# Patient Record
Sex: MALE | Race: BLACK OR AFRICAN AMERICAN | NOT HISPANIC OR LATINO | URBAN - METROPOLITAN AREA
[De-identification: names, ages, dates, MRNs, and addresses within clinical notes are randomized per-mention and may not be internally consistent; named-entity substitution may affect disease eponyms.]

---

## 2024-11-03 ENCOUNTER — INPATIENT (INPATIENT)
Facility: HOSPITAL | Age: 79
LOS: 4 days | Discharge: HOME | End: 2024-11-08
Attending: HOSPITALIST | Admitting: HOSPITALIST
Payer: MEDICARE

## 2024-11-03 VITALS
OXYGEN SATURATION: 96 % | DIASTOLIC BLOOD PRESSURE: 61 MMHG | HEART RATE: 88 BPM | SYSTOLIC BLOOD PRESSURE: 94 MMHG | RESPIRATION RATE: 18 BRPM | TEMPERATURE: 99 F

## 2024-11-03 DIAGNOSIS — Z96.651 PRESENCE OF RIGHT ARTIFICIAL KNEE JOINT: Chronic | ICD-10-CM

## 2024-11-03 DIAGNOSIS — Z87.898 PERSONAL HISTORY OF OTHER SPECIFIED CONDITIONS: ICD-10-CM

## 2024-11-03 DIAGNOSIS — K86.89 OTHER SPECIFIED DISEASES OF PANCREAS: ICD-10-CM

## 2024-11-03 DIAGNOSIS — E80.6 OTHER DISORDERS OF BILIRUBIN METABOLISM: ICD-10-CM

## 2024-11-03 DIAGNOSIS — Z98.890 OTHER SPECIFIED POSTPROCEDURAL STATES: Chronic | ICD-10-CM

## 2024-11-03 DIAGNOSIS — R53.83 OTHER FATIGUE: ICD-10-CM

## 2024-11-03 DIAGNOSIS — R26.9 UNSPECIFIED ABNORMALITIES OF GAIT AND MOBILITY: ICD-10-CM

## 2024-11-03 DIAGNOSIS — R74.01 ELEVATION OF LEVELS OF LIVER TRANSAMINASE LEVELS: ICD-10-CM

## 2024-11-03 DIAGNOSIS — Z29.9 ENCOUNTER FOR PROPHYLACTIC MEASURES, UNSPECIFIED: ICD-10-CM

## 2024-11-03 LAB
ADD ON TEST-SPECIMEN IN LAB: SIGNIFICANT CHANGE UP
ADD ON TEST-SPECIMEN IN LAB: SIGNIFICANT CHANGE UP
ALBUMIN SERPL ELPH-MCNC: 3.5 G/DL — SIGNIFICANT CHANGE UP (ref 3.3–5)
ALP SERPL-CCNC: 300 U/L — HIGH (ref 40–120)
ALT FLD-CCNC: 183 U/L — HIGH (ref 4–41)
ANION GAP SERPL CALC-SCNC: 15 MMOL/L — HIGH (ref 7–14)
ANISOCYTOSIS BLD QL: SLIGHT — SIGNIFICANT CHANGE UP
APPEARANCE UR: CLEAR — SIGNIFICANT CHANGE UP
AST SERPL-CCNC: 122 U/L — HIGH (ref 4–40)
BACTERIA # UR AUTO: NEGATIVE /HPF — SIGNIFICANT CHANGE UP
BASOPHILS # BLD AUTO: 0 K/UL — SIGNIFICANT CHANGE UP (ref 0–0.2)
BASOPHILS NFR BLD AUTO: 0 % — SIGNIFICANT CHANGE UP (ref 0–2)
BILIRUB DIRECT SERPL-MCNC: 6.5 MG/DL — HIGH (ref 0–0.3)
BILIRUB INDIRECT FLD-MCNC: 1.5 MG/DL — HIGH (ref 0–1)
BILIRUB SERPL-MCNC: 8 MG/DL — HIGH (ref 0.2–1.2)
BILIRUB SERPL-MCNC: SIGNIFICANT CHANGE UP MG/DL (ref 0.2–1.2)
BILIRUB UR-MCNC: ABNORMAL
BUN SERPL-MCNC: 26 MG/DL — HIGH (ref 7–23)
CALCIUM SERPL-MCNC: 8.9 MG/DL — SIGNIFICANT CHANGE UP (ref 8.4–10.5)
CAST: 0 /LPF — SIGNIFICANT CHANGE UP (ref 0–4)
CHLORIDE SERPL-SCNC: 100 MMOL/L — SIGNIFICANT CHANGE UP (ref 98–107)
CO2 SERPL-SCNC: 24 MMOL/L — SIGNIFICANT CHANGE UP (ref 22–31)
COLOR SPEC: SIGNIFICANT CHANGE UP
CREAT SERPL-MCNC: 1.05 MG/DL — SIGNIFICANT CHANGE UP (ref 0.5–1.3)
DIFF PNL FLD: NEGATIVE — SIGNIFICANT CHANGE UP
EGFR: 71 ML/MIN/1.73M2 — SIGNIFICANT CHANGE UP
EOSINOPHIL # BLD AUTO: 0 K/UL — SIGNIFICANT CHANGE UP (ref 0–0.5)
EOSINOPHIL NFR BLD AUTO: 0 % — SIGNIFICANT CHANGE UP (ref 0–6)
GIANT PLATELETS BLD QL SMEAR: PRESENT — SIGNIFICANT CHANGE UP
GLUCOSE SERPL-MCNC: 159 MG/DL — HIGH (ref 70–99)
GLUCOSE UR QL: NEGATIVE MG/DL — SIGNIFICANT CHANGE UP
HCT VFR BLD CALC: 38.9 % — LOW (ref 39–50)
HGB BLD-MCNC: 13.4 G/DL — SIGNIFICANT CHANGE UP (ref 13–17)
IANC: 9.56 K/UL — HIGH (ref 1.8–7.4)
KETONES UR-MCNC: NEGATIVE MG/DL — SIGNIFICANT CHANGE UP
LEUKOCYTE ESTERASE UR-ACNC: NEGATIVE — SIGNIFICANT CHANGE UP
LYMPHOCYTES # BLD AUTO: 1.09 K/UL — SIGNIFICANT CHANGE UP (ref 1–3.3)
LYMPHOCYTES # BLD AUTO: 9.1 % — LOW (ref 13–44)
MACROCYTES BLD QL: SLIGHT — SIGNIFICANT CHANGE UP
MAGNESIUM SERPL-MCNC: 2.3 MG/DL — SIGNIFICANT CHANGE UP (ref 1.6–2.6)
MANUAL SMEAR VERIFICATION: SIGNIFICANT CHANGE UP
MCHC RBC-ENTMCNC: 28.6 PG — SIGNIFICANT CHANGE UP (ref 27–34)
MCHC RBC-ENTMCNC: 34.4 G/DL — SIGNIFICANT CHANGE UP (ref 32–36)
MCV RBC AUTO: 83.1 FL — SIGNIFICANT CHANGE UP (ref 80–100)
MONOCYTES # BLD AUTO: 1.31 K/UL — HIGH (ref 0–0.9)
MONOCYTES NFR BLD AUTO: 10.9 % — SIGNIFICANT CHANGE UP (ref 2–14)
NEUTROPHILS # BLD AUTO: 9.26 K/UL — HIGH (ref 1.8–7.4)
NEUTROPHILS NFR BLD AUTO: 73.7 % — SIGNIFICANT CHANGE UP (ref 43–77)
NEUTS BAND # BLD: 3.6 % — SIGNIFICANT CHANGE UP (ref 0–6)
NITRITE UR-MCNC: NEGATIVE — SIGNIFICANT CHANGE UP
PH UR: 5.5 — SIGNIFICANT CHANGE UP (ref 5–8)
PLAT MORPH BLD: NORMAL — SIGNIFICANT CHANGE UP
PLATELET # BLD AUTO: 133 K/UL — LOW (ref 150–400)
PLATELET COUNT - ESTIMATE: ABNORMAL
POIKILOCYTOSIS BLD QL AUTO: SLIGHT — SIGNIFICANT CHANGE UP
POTASSIUM SERPL-MCNC: 3.5 MMOL/L — SIGNIFICANT CHANGE UP (ref 3.5–5.3)
POTASSIUM SERPL-SCNC: 3.5 MMOL/L — SIGNIFICANT CHANGE UP (ref 3.5–5.3)
PROT SERPL-MCNC: 7 G/DL — SIGNIFICANT CHANGE UP (ref 6–8.3)
PROT UR-MCNC: 30 MG/DL
RBC # BLD: 4.68 M/UL — SIGNIFICANT CHANGE UP (ref 4.2–5.8)
RBC # FLD: 13.7 % — SIGNIFICANT CHANGE UP (ref 10.3–14.5)
RBC BLD AUTO: ABNORMAL
RBC CASTS # UR COMP ASSIST: 3 /HPF — SIGNIFICANT CHANGE UP (ref 0–4)
REVIEW: SIGNIFICANT CHANGE UP
SMUDGE CELLS # BLD: PRESENT — SIGNIFICANT CHANGE UP
SODIUM SERPL-SCNC: 139 MMOL/L — SIGNIFICANT CHANGE UP (ref 135–145)
SP GR SPEC: 1.09 — HIGH (ref 1–1.03)
SQUAMOUS # UR AUTO: 4 /HPF — SIGNIFICANT CHANGE UP (ref 0–5)
UROBILINOGEN FLD QL: 2 MG/DL (ref 0.2–1)
VARIANT LYMPHS # BLD: 2.7 % — SIGNIFICANT CHANGE UP (ref 0–6)
WBC # BLD: 11.98 K/UL — HIGH (ref 3.8–10.5)
WBC # FLD AUTO: 11.98 K/UL — HIGH (ref 3.8–10.5)
WBC UR QL: 0 /HPF — SIGNIFICANT CHANGE UP (ref 0–5)

## 2024-11-03 PROCEDURE — 99223 1ST HOSP IP/OBS HIGH 75: CPT

## 2024-11-03 PROCEDURE — 70450 CT HEAD/BRAIN W/O DYE: CPT | Mod: 26,MC

## 2024-11-03 PROCEDURE — 71045 X-RAY EXAM CHEST 1 VIEW: CPT | Mod: 26

## 2024-11-03 PROCEDURE — 74177 CT ABD & PELVIS W/CONTRAST: CPT | Mod: 26,MC

## 2024-11-03 PROCEDURE — 99285 EMERGENCY DEPT VISIT HI MDM: CPT

## 2024-11-03 RX ORDER — ENOXAPARIN SODIUM 40MG/0.4ML
40 SYRINGE (ML) SUBCUTANEOUS ONCE
Refills: 0 | Status: DISCONTINUED | OUTPATIENT
Start: 2024-11-03 | End: 2024-11-03

## 2024-11-03 RX ORDER — INFLUENZ VIR VAC TV P-SURF2003 15MCG/.5ML
0.5 SYRINGE (ML) INTRAMUSCULAR ONCE
Refills: 0 | Status: DISCONTINUED | OUTPATIENT
Start: 2024-11-03 | End: 2024-11-08

## 2024-11-03 RX ORDER — ENOXAPARIN SODIUM 40MG/0.4ML
40 SYRINGE (ML) SUBCUTANEOUS EVERY 24 HOURS
Refills: 0 | Status: DISCONTINUED | OUTPATIENT
Start: 2024-11-03 | End: 2024-11-06

## 2024-11-03 RX ORDER — OXYCODONE HYDROCHLORIDE 30 MG/1
2.5 TABLET ORAL EVERY 6 HOURS
Refills: 0 | Status: DISCONTINUED | OUTPATIENT
Start: 2024-11-03 | End: 2024-11-08

## 2024-11-03 RX ORDER — OXYCODONE HYDROCHLORIDE 30 MG/1
5 TABLET ORAL EVERY 6 HOURS
Refills: 0 | Status: DISCONTINUED | OUTPATIENT
Start: 2024-11-03 | End: 2024-11-08

## 2024-11-03 RX ORDER — CHLORHEXIDINE GLUCONATE 40 MG/ML
1 SOLUTION TOPICAL DAILY
Refills: 0 | Status: DISCONTINUED | OUTPATIENT
Start: 2024-11-03 | End: 2024-11-08

## 2024-11-03 RX ORDER — SODIUM CHLORIDE 9 MG/ML
1000 INJECTION, SOLUTION INTRAMUSCULAR; INTRAVENOUS; SUBCUTANEOUS ONCE
Refills: 0 | Status: COMPLETED | OUTPATIENT
Start: 2024-11-03 | End: 2024-11-03

## 2024-11-03 RX ORDER — LIDOCAINE HYDROCHLORIDE 40 MG/ML
1 SOLUTION TOPICAL DAILY
Refills: 0 | Status: DISCONTINUED | OUTPATIENT
Start: 2024-11-03 | End: 2024-11-08

## 2024-11-03 RX ADMIN — Medication 50 MILLILITER(S): at 18:38

## 2024-11-03 RX ADMIN — SODIUM CHLORIDE 1000 MILLILITER(S): 9 INJECTION, SOLUTION INTRAMUSCULAR; INTRAVENOUS; SUBCUTANEOUS at 12:19

## 2024-11-03 NOTE — ED PROVIDER NOTE - ATTENDING CONTRIBUTION TO CARE
pt evaluated at 11:09 on 11/3/24   The patient is a 79y Male who denies any prior  past medical and surgery history  PTED with generalized weakness progressing to point where over the past 2 days  he can no longer walk as described No f,c,chest pain n,v,diarrhea HE/BRPR noted no UTI s/s No focal motor/sensory lesions noted     Vital Signs Last 24 Hrs  T(F): 98.6 HR: 89 BP: 102/71 RR: 21 SpO2: 100% (03 Nov 2024 11:00)   PE: as described; ? scleral icterus LUE resting tremor     DATA:  EKG: pending at time of evaluation  LAB                        13.4   11.98 )-----------( 133      ( 03 Nov 2024 11:02 )             38.9     11-03    139  |  100  |  26[H]  ----------------------------<  159[H]  3.5   |  24  |  1.05    Ca    8.9      03 Nov 2024 11:02  Mg     2.30     11-03    TPro  7.0  /  Alb  3.5  /  TBili  8.0[H]  /  DBili  x   /  AST  122[H]  /  ALT  183[H]  /  AlkPhos  300[H]  11-03     IMPRESSION/RISK:  Dx=  Weakness in older adult   Consideration include Constellation of s/s point to new malignancy (elevated Alk; could also be superimposed infection or progression of dementia (Parkinson's) or any combination of the above labs = prerenal azotemia   Plan  labs type and screen electrolytes creatiinne LFTs lipase CXR UA CTS H/CAP reassess dispo as per results and response pt evaluated at 11:09 on 11/3/24   The patient is a 79y Male who denies any prior  past medical and surgery history  PTED with generalized weakness progressing to point where over the past 2 days  he can no longer walk as described No f,c,chest pain n,v,diarrhea HE/BRPR noted no UTI s/s No focal motor/sensory lesions noted     Vital Signs Last 24 Hrs  T(F): 98.6 HR: 89 BP: 102/71 RR: 21 SpO2: 100% (03 Nov 2024 11:00)   PE: as described; ? scleral icterus LUE resting tremor     DATA:  EKG: pending at time of evaluation  LAB                        13.4   11.98 )-----------( 133      ( 03 Nov 2024 11:02 )             38.9     11-03    139  |  100  |  26[H]  ----------------------------<  159[H]  3.5   |  24  |  1.05    Ca    8.9      03 Nov 2024 11:02  Mg     2.30     11-03    TPro  7.0  /  Alb  3.5  /  TBili  8.0[H]  /  DBili  x   /  AST  122[H]  /  ALT  183[H]  /  AlkPhos  300[H]  11-03     IMPRESSION/RISK:  Dx=  Weakness in older adult   Consideration include Constellation of s/s point to new malignancy (elevated Alk; could also be superimposed infection or progression of dementia (Parkinson's) or any combination of the above labs = prerenal azotemia   Plan  labs type and screen electrolytes creatiinne LFTs lipase CXR UA CTS H/CAP reassess management as per results and response but definite admit given inability to walk

## 2024-11-03 NOTE — ED PROVIDER NOTE - CLINICAL SUMMARY MEDICAL DECISION MAKING FREE TEXT BOX
79-year-old male no past medical history presents with 2 days of generalized weakness, worsening lower extremities, associated with 1 day of lightheadedness.  Patient can typically ambulate normally but has had difficulty the past 2 days.  Has not seen a doctor nor denies fevers, chest pain, shortness of breath, vomiting, nausea, vomiting, urinary symptoms afebrile, not tachycardic, lungs clear, epigastric and right upper quadrant tenderness to palpation, slight scleral icterus. Slight left tremor, shuffling gait. Possible undiagnosed Parkinson's, intraabdominal or intracranial mass.  Will also eval for infectious, etiology, electrolyte derangement. Labs, CT, fluids, reassess.

## 2024-11-03 NOTE — H&P ADULT - NSHPLABSRESULTS_GEN_ALL_CORE
LABS:                        13.4   11.98 )-----------( 133      ( 03 Nov 2024 11:02 )             38.9     11-03    139  |  100  |  26[H]  ----------------------------<  159[H]  3.5   |  24  |  1.05    Ca    8.9      03 Nov 2024 11:02  Mg     2.30     11-03    TPro  7.0  /  Alb  3.5  /  TBili  8.0[H]  /  DBili  x   /  AST  122[H]  /  ALT  183[H]  /  AlkPhos  300[H]  11-03          Urinalysis Basic - ( 03 Nov 2024 11:02 )    Color: x / Appearance: x / SG: x / pH: x  Gluc: 159 mg/dL / Ketone: x  / Bili: x / Urobili: x   Blood: x / Protein: x / Nitrite: x   Leuk Esterase: x / RBC: x / WBC x   Sq Epi: x / Non Sq Epi: x / Bacteria: x LABS:                        13.4   11.98 )-----------( 133      ( 03 Nov 2024 11:02 )             38.9     11-03    139  |  100  |  26[H]  ----------------------------<  159[H]  3.5   |  24  |  1.05    Ca    8.9      03 Nov 2024 11:02  Mg     2.30     11-03    TPro  7.0  /  Alb  3.5  /  TBili  8.0[H]  /  DBili  x   /  AST  122[H]  /  ALT  183[H]  /  AlkPhos  300[H]  11-03      < from: CT Head No Cont (11.03.24 @ 12:03) >    FINDINGS:    VENTRICLES AND SULCI: Age appropriate involutional changes.  INTRA-AXIAL: No mass effect, acute hemorrhage, or midline shift.  There   are periventricular and subcortical white matter hypodensities,   consistent with microvascular type changes.  EXTRA-AXIAL: No mass or fluid collection. Basal cisterns are normal in   appearance.    VISUALIZED SINUSES:  Clear.  TYMPANOMASTOID CAVITIES:  Clear.  VISUALIZED ORBITS: Normal.  CALVARIUM: Intact.    MISCELLANEOUS: None.      IMPRESSION:  No acute intracranial hemorrhage, mass effect, or midline shift.    < end of copied text >    < from: CT Abdomen and Pelvis w/ IV Cont (11.03.24 @ 12:04) >    FINDINGS:  LOWER CHEST: Within normal limits.    LIVER: Multiple hepatic cysts measuring up to 3.3 cm in the left hepatic   lobe. Etiology pressure.  BILE DUCTS: Moderate intrahepatic biliary ductal dilatation with 2.0 cm   dilated CBD with abrupt cut off at a 1.8 x 2.3 x 1.6 cm periampullary   mass.  GALLBLADDER: Suspected small gallbladder without definitive gallstones.  SPLEEN: Within normal limits.  PANCREAS: 1.8 x 2.3 x 1.6 cm periampullary mass suspected series 301   image 58. No pancreatic ductal dilatation.  ADRENALS: Within normal limits.  KIDNEYS/URETERS: No renal stones or hydronephrosis. Left renal cyst.    BLADDER: Within normal limits.  REPRODUCTIVE ORGANS: Prostate within normal limits.    BOWEL: No bowel obstruction. Appendix is normal.  4.1 x 4.8 cm fluid collection or cyst adjacent to the left edge of the   liver and wall the stomach.  Large volume rectal stool without evidence of stercoral colitis.  PERITONEUM/RETROPERITONEUM: Within normal limits.  VESSELS: Within normal limits.  LYMPH NODES: No lymphadenopathy.  ABDOMINAL WALL: Tiny fat-containing right inguinal hernia. Nonspecific   small inguinal lymph nodes  BONES: Degenerative changes.    IMPRESSION:  1.8 x 2.3 x 1.6 cm suspected periampullary mass with moderate intra and   extrahepatic biliary ductal dilatation. No pancreatic ductal dilatation.    4.1 x 4.8 cm simple appearing fluid collection or cyst possibly arising   from either the left hepatic lobe or wall of the stomach. Causing mild   mass effect on the stomach.    Endoscopic ultrasound should be considered given the location            Urinalysis Basic - ( 03 Nov 2024 11:02 )    Color: x / Appearance: x / SG: x / pH: x  Gluc: 159 mg/dL / Ketone: x  / Bili: x / Urobili: x   Blood: x / Protein: x / Nitrite: x   Leuk Esterase: x / RBC: x / WBC x   Sq Epi: x / Non Sq Epi: x / Bacteria: x

## 2024-11-03 NOTE — ED PROVIDER NOTE - PROGRESS NOTE DETAILS
Patient found to have elevated LFTs and bilirubin,.  CT noticeable for periampullary mass with moderate intra and extrahepatic biliary duct dilatation.  Results discussed with patient and patient's family.  To be admitted.  Travis Bui PGY-3

## 2024-11-03 NOTE — ED ADULT NURSE NOTE - OBJECTIVE STATEMENT
Pt A&Ox4 to ED c/o dizziness, loss of appetite and generalized weakness with ambulation x 2 days. Denies chest pain, dizziness, blurry vision, sob. Facial symmetry noted. respirations even and unlabored. on cardiac monitor, NSR. 20g IV RAC. Labs drawn and sent. No acute distress noted at this time. Pending CT. Plan of care continued. Pt A&Ox4 to ED c/o dizziness, loss of appetite and generalized weakness with ambulation x 2 days. Dizziness since subsided. Denies chest pain, blurry vision, sob. Facial symmetry noted. respirations even and unlabored. on cardiac monitor, NSR. 20g IV RAC. Labs drawn and sent. No acute distress noted at this time. Pending CT. Plan of care continued. Pt A&Ox4 to ED c/o dizziness, loss of appetite and generalized weakness with ambulation x 2 days. Dizziness since subsided. Denies chest pain, blurry vision, sob. Facial symmetry noted. respirations even and unlabored. on cardiac monitor, NSR. 20g IV RAC. Icterus noted bilaterally. Labs drawn and sent. No acute distress noted at this time. Pending CT. Plan of care continued.

## 2024-11-03 NOTE — H&P ADULT - PROBLEM SELECTOR PLAN 2
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 Elevated alk phos, T. bili, and GGT concerning for cholestatic picture.     - F/u GI recs   - Consider MRCP Periampullary mass found on CTAP, with intrahepatic bile duct dilation. Elevated alk phos, T. bili, and GGT concerning for cholestatic picture.     - F/u GI recs   - Consider MRCP  - CEA,  Periampullary mass found on CTAP, with intrahepatic bile duct dilation. Elevated alk phos, T. bili, and GGT concerning for cholestatic picture.     - F/u GI recs   - MRCP   - CEA,

## 2024-11-03 NOTE — ED ADULT NURSE NOTE - NSFALLHARMRISKINTERV_ED_ALL_ED

## 2024-11-03 NOTE — H&P ADULT - PROBLEM SELECTOR PLAN 1
Patient with week-long history of migratory abdominal pain, likely related to mass found on imaging.    - Pain control with Tylenol PRN  - F/u GI recs  - Trend LFT's, bilirubin  - Serial abdominal exams Patient with week-long history of migratory abdominal pain, likely related to mass found on imaging.    - oxy 2.5 q6 PRN for mild and oxy 5q6 for moderate/severe pain   - F/u GI recs  - Trend LFT's, bilirubin  - Serial abdominal exams

## 2024-11-03 NOTE — H&P ADULT - PROBLEM SELECTOR PLAN 5
Patient with history of fatigue, unclear etiology however likely related to pancreatic mass; hemoglobin wnl     - Encourage PO intake   - F/u nutrition consult   - F/u TSH, B12, B9 levels
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
## 2024-11-03 NOTE — H&P ADULT - ASSESSMENT
Patient is an 79 year old male with no significant past medical history, presenting to the ED for history of abdominal pain and weakness. Patient's imaging concerning for pancreatic mass with hepatic cysts; orthopnea and weakness could also be related to undiagnosed CHF. Gastroenterology following for potential biopsy as well as biliary stent given patient's elevated bilirubin.

## 2024-11-03 NOTE — PATIENT PROFILE ADULT - FALL HARM RISK - PATIENT NEEDS ASSISTANCE
Standing/Walking/Toileting
Pt denies having traveled outside the country for the last 14 days. Pt denies having had the COVID19 infection and denies COVID19 positive contacts within the last 14 days. Pt denies receiving the COVID19 vaccine.

## 2024-11-03 NOTE — ED PROVIDER NOTE - PHYSICAL EXAMINATION
GEN: NAD. A&Ox3. Non-toxic appearing.  HEENT: normocephalic, atraumatic, EOMI, PERRL, slight scleral icterus, no conjuntival pallor, moist MM  CARDIAC: RRR, S1, S2, no murmur. Radial pulses present and symmetric b/l  PULM: CTA B/L no wheeze, rhonchi, rales.   ABD: soft NT, ND, no rebound no guarding   MSK: Moving all extremities, no edema  NEURO: shuffling gait, slight LUE tremor no focal neurological deficits, CN 2-12 grossly intact  SKIN: warm, dry, no rash.

## 2024-11-03 NOTE — H&P ADULT - PROBLEM SELECTOR PLAN 3
Total bilirbuin elevated to 8, cholestatic picture including elevations in GGT, Alk Phos, and LFT's    - F/u direct bili in the AM  - Continue to trend Total bilirubin elevated to 8, cholestatic picture including elevations in GGT, Alk Phos, and LFT's    - F/u direct bili in the AM  - Continue to trend

## 2024-11-03 NOTE — CHART NOTE - NSCHARTNOTEFT_GEN_A_CORE
GI consult received and chart reviewed. Patient likely requires EUS/ERCP for evaluation of pancreatic mass and drainage of biliary stricture. Made NPO @ [facility] for possible procedure Monday pending reviewed with advanced GI attending and schedule availability. Please obtain 4AM labs including PT/INR. Formal consult to follow in the AM.    Eduin Doherty MD  Gastroenterology/Hepatology Fellow

## 2024-11-03 NOTE — H&P ADULT - HISTORY OF PRESENT ILLNESS
Patient is an 79 year old male with no significant past medial history presenting to the ED with a week long history of vague abdominal pain that travels throughout his abdomen that he describes as "cutting across his abdomen", and weakness that has been bothering him for the past week. He has been struggling to get out of bed for the past week limited by his weakness. Patient has not had close follow up with a doctor in years, and has had no colonoscopies in the past. Does not take any medications. Patient is also complaining of constipation, and mentions decreased appetite but no significant weight loss. Also endorses two day history of voice hoarseness. Patient with no significant family history, mother lived to 101 and father lived to 98.     Patient's friend at bedside also mentioning that patient has been having gait disturbances; mentioning what appeared to be shuffling gait. Patient had difficulty getting up from bed so could not demonstrate. Also mentioned a three month history of left hand tremor, but is not present at time of interview.     In the ED, patient's BP was low at 94/61, and got 1 Liter of fluids, with BP readings going up to 119/70 after fluid resuscitation Otherwise patient's vitals were stable and was admitted to the floor in stable condition.

## 2024-11-03 NOTE — ED ADULT TRIAGE NOTE - CHIEF COMPLAINT QUOTE
Pt c/o dizziness starting 5 AM this morning with unsteady gait. No facial droop or slurred speech. Equal strength to BUE, BLE. Endorsing generalized weakness. At mental baseline as per family. Pt has no known medical Hx.  . MD Garcia called for stroke eval. No code stroke as per MD.

## 2024-11-03 NOTE — PATIENT PROFILE ADULT - FLU SEASON?
You can access the FollowMyHealth Patient Portal offered by [facility] by registering at the following website: [email or web address]. By joining [facility]’s FollowMyHealth portal, you will also be able to view your health information using other applications (apps) compatible with our system.
Yes...

## 2024-11-03 NOTE — H&P ADULT - ATTENDING COMMENTS
79 year old male with no significant past medical history p/w abdominal pain and weakness.     CT A/P  1.8 x 2.3 x 1.6 cm suspected periampullary mass with moderate intra and   extrahepatic biliary ductal dilatation. No pancreatic ductal dilatation. 4.1 x 4.8 cm simple appearing fluid collection or cyst possibly arising   from either the left hepatic lobe or wall of the stomach. Causing mild mass effect on the stomach.    #Abp pain:  --Likely i/s/o periampullary mass/ biliary ductal dilatation  --Obtain MRCP  --PRN Oxycodone for pain control  --GI Eval for possible ERCP    DVT: Lovenox  DIET: Regular  DISPO: Pending hospital course 79 year old male with no significant past medical history p/w abdominal pain and weakness.     CT A/P  1.8 x 2.3 x 1.6 cm suspected periampullary mass with moderate intra and   extrahepatic biliary ductal dilatation. No pancreatic ductal dilatation. 4.1 x 4.8 cm simple appearing fluid collection or cyst possibly arising   from either the left hepatic lobe or wall of the stomach. Causing mild mass effect on the stomach.    #Abd pain:  --Likely i/s/o periampullary mass/ biliary ductal dilatation  --Obtain MRCP  --PRN Oxycodone for pain control  --GI Eval for possible ERCP    DVT: Lovenox  DIET: Regular  DISPO: Pending hospital course

## 2024-11-03 NOTE — PATIENT PROFILE ADULT - FALL HARM RISK - RISK INTERVENTIONS

## 2024-11-03 NOTE — H&P ADULT - NSHPSOCIALHISTORY_GEN_ALL_CORE
Patient does not smoke, or use illicit substances. Patient consumes alcohol only on rare occasions.    Patient lives alone and takes care of all his ADL's. Former , now  retired.

## 2024-11-03 NOTE — H&P ADULT - PROBLEM SELECTOR PLAN 6
Patient with history of not being to sleep flat, and new voice hoarseness, with no lower extremity edema. Unlikely to be CHF, however cannot rule out at this point.    - F/u TTE results  - PT joseal Patient with history of not being to sleep flat, and new voice hoarseness, with no lower extremity edema. Unlikely to be CHF, however cannot rule out at this point.    - F/u TTE results  - PT eval  - CXR Patient with history of not being to sleep flat, and new voice hoarseness, with no lower extremity edema. Unlikely to be CHF, however cannot rule out at this point.    - F/u TTE results  - PT eval  - CXR  - D-dimer

## 2024-11-03 NOTE — H&P ADULT - NSHPREVIEWOFSYSTEMS_GEN_ALL_CORE
REVIEW OF SYSTEMS:    CONSTITUTIONAL: No weakness, fevers or chills  EYES/ENT: No visual changes;  No vertigo or throat pain   NECK: No pain or stiffness  RESPIRATORY: No cough, wheezing, hemoptysis; No shortness of breath  CARDIOVASCULAR: No chest pain or palpitations  GASTROINTESTINAL: No abdominal or epigastric pain. No nausea, vomiting, or hematemesis; No diarrhea or constipation. No melena or hematochezia.  GENITOURINARY: No dysuria, frequency or hematuria  NEUROLOGICAL: No numbness or weakness  SKIN: No itching, rashes REVIEW OF SYSTEMS:    CONSTITUTIONAL: + weakness, No fevers or chills  EYES/ENT: + voice hoarseness, No visual changes;  No vertigo or throat pain   NECK: No pain or stiffness  RESPIRATORY: No cough, wheezing, hemoptysis; No shortness of breath  CARDIOVASCULAR: + orthopnea, No chest pain or palpitations  GASTROINTESTINAL: + abdominal pain, decreased appetite and constipation. No nausea, vomiting, or hematemesis; No diarrhea.   GENITOURINARY: No dysuria, frequency or hematuria  NEUROLOGICAL: No numbness or weakness  SKIN: No itching, rashes

## 2024-11-03 NOTE — H&P ADULT - TIME BILLING
reviewing laboratory data, consultants' recommendations, documentation in [place], performing medically appropriate examinations/evaluations, discussion with patient/family/RN/ACP/Residents and interdisciplinary staff (such as , social workers, etc), counseling patient/family/care giver, ordering medically appropriate medication, tests, or procedures

## 2024-11-03 NOTE — H&P ADULT - NSHPPHYSICALEXAM_GEN_ALL_CORE
GENERAL: NAD, lying in bed comfortably  HEAD: Atraumatic, normocephalic  EYES: EOMI, PERRLA, conjunctiva and sclera clear  ENT: Moist mucous membranes  NECK: Supple, no JVD, no thyroidmegaly   HEART: S1, S2, Regular rate and rhythm, no murmurs, rubs, or gallops  LUNGS: Unlabored respirations, clear to auscultation bilaterally, no crackles, wheezing, or rhonchi  ABDOMEN: Soft, nontender, nondistended, +BS, no hepatosplenomegaly, no CVA tenderness   EXTREMITIES: 2+ peripheral pulses bilaterally. 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 No clubbing, cyanosis, or edema  NERVOUS SYSTEM:  A&Ox3, no focal deficits. 5/5 strength in bilateral hip flexors, extensors, knee flexors and extensors, and plantar/dorsiflexion of the feet. No visible tremor.   SKIN: No rashes or lesions

## 2024-11-03 NOTE — H&P ADULT - PROBLEM SELECTOR PLAN 4
Patient with new onset gait disturbance and history of hand tremors, potentially concerning for Parkinson's disease.    - PT eval   - F/u TSH, B12, B9,   - Revaluate gait when patient Patient with new onset gait disturbance and history of hand tremors, potentially concerning for Parkinson's disease.    - PT eval   - F/u TSH, B12, B9  - Revaluate gait when patient regains strength

## 2024-11-03 NOTE — H&P ADULT - PROBLEM SELECTOR PLAN 8
- Electrolytes: Will replete to maintain K>4, Phos>3, and Mag>2  - Nutrition: Regular diet   - DVT Prophylaxis: Lovenox   - Disposition: Pending PT eval - Fluids: LR @50 cc/hr for 10 hours   - Electrolytes: Will replete to maintain K>4, Phos>3, and Mag>2  - Nutrition: Regular diet   - DVT Prophylaxis: Lovenox   - Disposition: Pending PT eval - Fluids: LR @50 cc/hr for 20 hours   - Electrolytes: Will replete to maintain K>4, Phos>3, and Mag>2  - Nutrition: Regular diet   - DVT Prophylaxis: Lovenox   - Disposition: Pending PT eval

## 2024-11-03 NOTE — PATIENT PROFILE ADULT - VISION (WITH CORRECTIVE LENSES IF THE PATIENT USUALLY WEARS THEM):
Patient wears glasses/Normal vision: sees adequately in most situations; can see medication labels, newsprint

## 2024-11-04 LAB
ALBUMIN SERPL ELPH-MCNC: 3 G/DL — LOW (ref 3.3–5)
ALP SERPL-CCNC: 272 U/L — HIGH (ref 40–120)
ALT FLD-CCNC: 133 U/L — HIGH (ref 4–41)
ANION GAP SERPL CALC-SCNC: 14 MMOL/L — SIGNIFICANT CHANGE UP (ref 7–14)
AST SERPL-CCNC: 63 U/L — HIGH (ref 4–40)
BILIRUB DIRECT SERPL-MCNC: 2.6 MG/DL — HIGH (ref 0–0.3)
BILIRUB INDIRECT FLD-MCNC: 1 MG/DL — SIGNIFICANT CHANGE UP (ref 0–1)
BILIRUB SERPL-MCNC: 3.6 MG/DL — HIGH (ref 0.2–1.2)
BILIRUB SERPL-MCNC: 3.6 MG/DL — HIGH (ref 0.2–1.2)
BUN SERPL-MCNC: 24 MG/DL — HIGH (ref 7–23)
CALCIUM SERPL-MCNC: 8.6 MG/DL — SIGNIFICANT CHANGE UP (ref 8.4–10.5)
CEA SERPL-MCNC: 1.9 NG/ML — SIGNIFICANT CHANGE UP (ref 0–3.8)
CHLORIDE SERPL-SCNC: 102 MMOL/L — SIGNIFICANT CHANGE UP (ref 98–107)
CO2 SERPL-SCNC: 25 MMOL/L — SIGNIFICANT CHANGE UP (ref 22–31)
CREAT SERPL-MCNC: 0.81 MG/DL — SIGNIFICANT CHANGE UP (ref 0.5–1.3)
D DIMER BLD IA.RAPID-MCNC: 2971 NG/ML DDU — HIGH
EGFR: 89 ML/MIN/1.73M2 — SIGNIFICANT CHANGE UP
FOLATE SERPL-MCNC: 14.7 NG/ML — SIGNIFICANT CHANGE UP (ref 3.1–17.5)
GLUCOSE SERPL-MCNC: 115 MG/DL — HIGH (ref 70–99)
HCT VFR BLD CALC: 37.7 % — LOW (ref 39–50)
HGB BLD-MCNC: 12.8 G/DL — LOW (ref 13–17)
MAGNESIUM SERPL-MCNC: 2.6 MG/DL — SIGNIFICANT CHANGE UP (ref 1.6–2.6)
MCHC RBC-ENTMCNC: 29 PG — SIGNIFICANT CHANGE UP (ref 27–34)
MCHC RBC-ENTMCNC: 34 G/DL — SIGNIFICANT CHANGE UP (ref 32–36)
MCV RBC AUTO: 85.3 FL — SIGNIFICANT CHANGE UP (ref 80–100)
NRBC # BLD: 0 /100 WBCS — SIGNIFICANT CHANGE UP (ref 0–0)
NRBC # FLD: 0 K/UL — SIGNIFICANT CHANGE UP (ref 0–0)
PHOSPHATE SERPL-MCNC: 3 MG/DL — SIGNIFICANT CHANGE UP (ref 2.5–4.5)
PLATELET # BLD AUTO: 137 K/UL — LOW (ref 150–400)
POTASSIUM SERPL-MCNC: 3.5 MMOL/L — SIGNIFICANT CHANGE UP (ref 3.5–5.3)
POTASSIUM SERPL-SCNC: 3.5 MMOL/L — SIGNIFICANT CHANGE UP (ref 3.5–5.3)
PROT SERPL-MCNC: 6.8 G/DL — SIGNIFICANT CHANGE UP (ref 6–8.3)
RBC # BLD: 4.42 M/UL — SIGNIFICANT CHANGE UP (ref 4.2–5.8)
RBC # FLD: 14.2 % — SIGNIFICANT CHANGE UP (ref 10.3–14.5)
SODIUM SERPL-SCNC: 141 MMOL/L — SIGNIFICANT CHANGE UP (ref 135–145)
T4 AB SER-ACNC: 6.72 UG/DL — SIGNIFICANT CHANGE UP (ref 5.1–13)
TSH SERPL-MCNC: 4.95 UIU/ML — HIGH (ref 0.27–4.2)
VIT B12 SERPL-MCNC: 255 PG/ML — SIGNIFICANT CHANGE UP (ref 200–900)
WBC # BLD: 13.6 K/UL — HIGH (ref 3.8–10.5)
WBC # FLD AUTO: 13.6 K/UL — HIGH (ref 3.8–10.5)

## 2024-11-04 PROCEDURE — 99222 1ST HOSP IP/OBS MODERATE 55: CPT | Mod: FS,GC,25

## 2024-11-04 PROCEDURE — 99233 SBSQ HOSP IP/OBS HIGH 50: CPT | Mod: GC

## 2024-11-04 PROCEDURE — 71275 CT ANGIOGRAPHY CHEST: CPT | Mod: 26

## 2024-11-04 RX ORDER — SENNA 187 MG
2 TABLET ORAL AT BEDTIME
Refills: 0 | Status: DISCONTINUED | OUTPATIENT
Start: 2024-11-04 | End: 2024-11-08

## 2024-11-04 RX ORDER — MAGNESIUM HYDROXIDE 1200 MG/15ML
30 SUSPENSION ORAL DAILY
Refills: 0 | Status: DISCONTINUED | OUTPATIENT
Start: 2024-11-04 | End: 2024-11-08

## 2024-11-04 RX ORDER — POTASSIUM CHLORIDE 10 MEQ
20 TABLET, EXTENDED RELEASE ORAL
Refills: 0 | Status: COMPLETED | OUTPATIENT
Start: 2024-11-04 | End: 2024-11-04

## 2024-11-04 RX ORDER — POLYETHYLENE GLYCOL 3350 17 G/17G
17 POWDER, FOR SOLUTION ORAL
Refills: 0 | Status: DISCONTINUED | OUTPATIENT
Start: 2024-11-04 | End: 2024-11-08

## 2024-11-04 RX ORDER — MELATONIN 5 MG
6 TABLET ORAL AT BEDTIME
Refills: 0 | Status: DISCONTINUED | OUTPATIENT
Start: 2024-11-04 | End: 2024-11-08

## 2024-11-04 RX ADMIN — Medication 20 MILLIEQUIVALENT(S): at 18:29

## 2024-11-04 RX ADMIN — Medication 20 MILLIEQUIVALENT(S): at 21:52

## 2024-11-04 RX ADMIN — Medication 20 MILLIEQUIVALENT(S): at 16:24

## 2024-11-04 RX ADMIN — Medication 40 MILLIGRAM(S): at 21:58

## 2024-11-04 RX ADMIN — CHLORHEXIDINE GLUCONATE 1 APPLICATION(S): 40 SOLUTION TOPICAL at 16:25

## 2024-11-04 RX ADMIN — Medication 6 MILLIGRAM(S): at 21:57

## 2024-11-04 RX ADMIN — Medication 2 TABLET(S): at 21:55

## 2024-11-04 RX ADMIN — MAGNESIUM HYDROXIDE 30 MILLILITER(S): 1200 SUSPENSION ORAL at 01:35

## 2024-11-04 NOTE — PROGRESS NOTE ADULT - PROBLEM SELECTOR PLAN 3
Total bilirubin elevated to 8, cholestatic picture including elevations in GGT, Alk Phos, and LFT's    - F/u direct bili in the AM  - Continue to trend Patient with history of not being to sleep flat, and new voice hoarseness, with no lower extremity edema. Unlikely to be CHF, however cannot rule out at this point. Pt describes SOB when laying on abdomen and not flat on back over the last three days, less concern for acute cardio/pulm etiology. Rule out PE iso elevated D-dimer, and potential malignancy.     - F/u TTE results  - PT eval  - CXR: bibasilar atelactasis  - D-dimer: 2971 --> f/u CTPA (11/4) to rule out PE iso potential malignancy though vitals WNL and no chest pain.

## 2024-11-04 NOTE — DIETITIAN INITIAL EVALUATION ADULT - OTHER INFO
Unable to meet patient at bedside. Pt off of unit for procedure. Pt' son at bedside provided collateral. Observed pt's lunch tray with ~50% intake. Per RN flowsheet, Pt with variable PO intake 26-50% and 51-75% noted. Pt' son amenable to provide Orgain supplement 2x daily to optimize macronutrient intake. No GI distress reported i.e. nausea, vomiting, diarrhea. Per chart, Pt reported constipation. Unknown last BM. Recommend to continue to monitor and document in RN flowsheet. Consider bowel regimen as per MD discretion. No prior wt hx as per Henry GODOY. Labs noted for elevated BUN. Noted provision of IV hydration (lactated ringers) per medication list.

## 2024-11-04 NOTE — DIETITIAN INITIAL EVALUATION ADULT - REASON FOR ADMISSION
Per chart, Patient is an 79 year old male with no significant past medical history, presenting to the ED for history of abdominal pain and weakness. Labs and imaging were reflective of pancreatic mass with biliary obstruction, concerning for pancreatic malignancy, complicated by orthopnea, now admitted for further work up with GI following for biopsy via EUS/ERCP.

## 2024-11-04 NOTE — DIETITIAN INITIAL EVALUATION ADULT - PERSON TAUGHT/METHOD
Educated pt' son on the importance of consuming a diet adequate in protein and calories. Recommend to encouraged pt to consume meals starting with the protein rich food options. Discussed the benefits of oral nutrition supplementation; to encourage pt to take small sips in between meals to optimize protein intake. Pt' son receptive to education and made aware RD remains available upon request./son instructed

## 2024-11-04 NOTE — PROGRESS NOTE ADULT - PROBLEM SELECTOR PLAN 6
Patient with history of not being to sleep flat, and new voice hoarseness, with no lower extremity edema. Unlikely to be CHF, however cannot rule out at this point.    - F/u TTE results  - PT eval  - CXR  - D-dimer Patient with history of fatigue, unclear etiology however likely related to pancreatic mass; hemoglobin wnl     - Encourage PO intake   - F/u nutrition consult   - F/u TSH, B12, B9 levels: largely unremarkable

## 2024-11-04 NOTE — DIETITIAN INITIAL EVALUATION ADULT - ADD RECOMMEND
- Recommend to adjust diet to Lactose free.   - Nutrition department to provide Orgain Vegan Vanilla 2x daily (220kcal/16gm pro/11 fl oz).  - Recommend cont. to monitor BMs and document in RN flow sheet, if pt still experiencing constipation consider adding bowel regimen as per medical discretion.   - Encourage and assist Pt with meals, Monitor PO diet tolerance.   - Monitor weights, diet tolerance, skin integrity, BMs, pertinent labs.   - RDN services remain available as needed.   - Nutrition department to honor food preferences as feasible.

## 2024-11-04 NOTE — PROGRESS NOTE ADULT - PROBLEM SELECTOR PLAN 2
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 Elevated alk phos, T. bili, and GGT concerning for cholestatic picture.     - F/u GI recs   - MRCP   - CEA,  Patient with week-long history of migratory abdominal pain, likely related to mass found on imaging. Constipation can be another contributing factor as abdomen is distended on exam, CT abdomen revealed stool burden, and patient reports he is constipated. Low concern for peritonitis at this time, serial abdominal exams deferred.     - oxy 2.5 q6 PRN for mild and oxy 5q6 for moderate/severe pain   - F/u GI recs: see pancreatic mass  - Trend LFT's, bilirubin  - Bowel regimen for constipation

## 2024-11-04 NOTE — DIETITIAN INITIAL EVALUATION ADULT - ORAL INTAKE PTA/DIET HISTORY
Pt lives at home with family. They cook together at home. No difficulty obtaining groceries. No specific diet followed PTA. Pt' son reported decreased appetite for about 4 days related to abdominal pain. Per Pt' son lactose intolerance reported. NKFA reported. Will recommend to adjust diet to Lactose free. Denies chewing or swallowing difficulties PTA. No vitamins supplements taken PTA.

## 2024-11-04 NOTE — PROGRESS NOTE ADULT - PROBLEM SELECTOR PLAN 1
Patient with week-long history of migratory abdominal pain, likely related to mass found on imaging.    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 Elevated alk phos, T. bili, and GGT concerning for cholestatic picture.     - F/u GI recs: plan for ERCP, EUS on 11/5, CT chest for staging, will need oncology and surgical oncology evaluation  - f/u: CEA, CA 19-9

## 2024-11-04 NOTE — DIETITIAN INITIAL EVALUATION ADULT - PERTINENT MEDS FT
MEDICATIONS  (STANDING):  chlorhexidine 2% Cloths 1 Application(s) Topical daily  enoxaparin Injectable 40 milliGRAM(s) SubCutaneous every 24 hours  influenza  Vaccine (HIGH DOSE) 0.5 milliLiter(s) IntraMuscular once  lactated ringers. 1000 milliLiter(s) (50 mL/Hr) IV Continuous <Continuous>  lidocaine   4% Patch 1 Patch Transdermal daily  melatonin 6 milliGRAM(s) Oral at bedtime  potassium chloride    Tablet ER 20 milliEquivalent(s) Oral every 2 hours    MEDICATIONS  (PRN):  magnesium hydroxide Suspension 30 milliLiter(s) Oral daily PRN Constipation  oxyCODONE    IR 2.5 milliGRAM(s) Oral every 6 hours PRN Moderate Pain (4 - 6)  oxyCODONE    IR 5 milliGRAM(s) Oral every 6 hours PRN Severe Pain (7 - 10)

## 2024-11-04 NOTE — PROGRESS NOTE ADULT - ASSESSMENT
Patient is an 79 year old male with no significant past medical history, presenting to the ED for history of abdominal pain and weakness. Patient's imaging concerning for pancreatic mass with hepatic cysts; orthopnea and weakness could also be related to undiagnosed CHF. Gastroenterology following for potential biopsy as well as biliary stent given patient's elevated bilirubin.  Patient is an 79 year old male with no significant past medical history, presenting to the ED for history of abdominal pain and weakness. Labs and imaging were reflective of pancreatic mass with biliary obstruction, concerning for pancreatic malignancy, complicated by orthopnea, now admitted for further work up with GI following for biopsy via EUS/ERCP.

## 2024-11-04 NOTE — PROGRESS NOTE ADULT - PROBLEM SELECTOR PLAN 8
- Fluids: LR @50 cc/hr for 20 hours   - Electrolytes: Will replete to maintain K>4, Phos>3, and Mag>2  - Nutrition: Regular diet   - DVT Prophylaxis: Lovenox   - Disposition: Pending PT eval - Fluids: LR @50 cc/hr for 20 hours  - Electrolytes: Will replete to maintain K>4, Phos>3, and Mag>2  - Nutrition: Regular diet   - DVT Prophylaxis: Lovenox   - Disposition: Pending PT eval

## 2024-11-04 NOTE — PROGRESS NOTE ADULT - SUBJECTIVE AND OBJECTIVE BOX
PROGRESS NOTE:   Authored by Leanne Martinez MD  Internal Medicine Resident Physician, PGY-1      Patient is a 79y old  Male who presents with a chief complaint of Abdominal pain, weakness (2024 14:30)      SUBJECTIVE / OVERNIGHT EVENTS: ***, patient seen and examined at bedside    MEDICATIONS  (STANDING):  chlorhexidine 2% Cloths 1 Application(s) Topical daily  enoxaparin Injectable 40 milliGRAM(s) SubCutaneous every 24 hours  influenza  Vaccine (HIGH DOSE) 0.5 milliLiter(s) IntraMuscular once  lactated ringers. 1000 milliLiter(s) (50 mL/Hr) IV Continuous <Continuous>  lidocaine   4% Patch 1 Patch Transdermal daily  melatonin 6 milliGRAM(s) Oral at bedtime    MEDICATIONS  (PRN):  magnesium hydroxide Suspension 30 milliLiter(s) Oral daily PRN Constipation  oxyCODONE    IR 2.5 milliGRAM(s) Oral every 6 hours PRN Moderate Pain (4 - 6)  oxyCODONE    IR 5 milliGRAM(s) Oral every 6 hours PRN Severe Pain (7 - 10)      CAPILLARY BLOOD GLUCOSE      POCT Blood Glucose.: 146 mg/dL (2024 09:59)    I&O's Summary    2024 07:01  -  2024 07:00  --------------------------------------------------------  IN: 300 mL / OUT: 300 mL / NET: 0 mL        PHYSICAL EXAM:  Vital Signs Last 24 Hrs  T(C): 36.6 (2024 05:47), Max: 37 (2024 10:02)  T(F): 97.8 (2024 05:47), Max: 98.6 (2024 10:02)  HR: 70 (2024 05:47) (70 - 89)  BP: 121/70 (2024 05:47) (94/61 - 121/70)  BP(mean): --  RR: 18 (2024 05:47) (17 - 21)  SpO2: 100% (2024 05:47) (96% - 100%)    Parameters below as of 2024 05:47  Patient On (Oxygen Delivery Method): room air      CONSTITUTIONAL: Well-groomed, in no apparent distress  RESPIRATORY: Breathing comfortably; no dullness to percussion; lungs CTA without wheeze/rhonchi/rales  CARDIOVASCULAR: +S1S2, RRR, no M/G/R; pedal pulses full and symmetric; no lower extremity edema  GASTROINTESTINAL: No palpable masses or tenderness, +BS throughout, no rebound/guarding; no hepatosplenomegaly; no hernia palpated  SKIN: No rashes or ulcers noted  NEUROLOGIC: A+O x 3, CN II-XII intact; sensation intact in LEs b/l to light touch    LABS:                        13.4   11.98 )-----------( 133      ( 2024 11:02 )             38.9     11    139  |  100  |  26[H]  ----------------------------<  159[H]  3.5   |  24  |  1.05    Ca    8.9      2024 11:02  Mg     2.30         TPro  7.0  /  Alb  3.5  /  TBili  8.0[H]  /  DBili  6.5[H]  /  AST  122[H]  /  ALT  183[H]  /  AlkPhos  300[H]  11          Urinalysis Basic - ( 2024 15:00 )    Color: Dark Yellow / Appearance: Clear / S.086 / pH: x  Gluc: x / Ketone: Negative mg/dL  / Bili: Moderate / Urobili: 2.0 mg/dL   Blood: x / Protein: 30 mg/dL / Nitrite: Negative   Leuk Esterase: Negative / RBC: 3 /HPF / WBC 0 /HPF   Sq Epi: x / Non Sq Epi: 4 /HPF / Bacteria: Negative /HPF        Urinalysis with Rflx Culture (collected 2024 15:00)        RADIOLOGY & ADDITIONAL TESTS:  Results Reviewed:   Imaging Personally Reviewed:  Electrocardiogram Personally Reviewed:   PROGRESS NOTE:   Authored by Leanne Martinez MD  Internal Medicine Resident Physician, PGY-1      Patient is a 79y old  Male who presents with a chief complaint of Abdominal pain, weakness (2024 14:30)      SUBJECTIVE / OVERNIGHT EVENTS: Patient seen and examined at bedside. Feels better than yesterday. Does not endorse abdominal pain, chest pain, SOB, nausea. Endorses early satiety, has no appetite, and does have constipation (attributes to psychosocial factor of being in the hospital and lack of privacy).      MEDICATIONS  (STANDING):  chlorhexidine 2% Cloths 1 Application(s) Topical daily  enoxaparin Injectable 40 milliGRAM(s) SubCutaneous every 24 hours  influenza  Vaccine (HIGH DOSE) 0.5 milliLiter(s) IntraMuscular once  lactated ringers. 1000 milliLiter(s) (50 mL/Hr) IV Continuous <Continuous>  lidocaine   4% Patch 1 Patch Transdermal daily  melatonin 6 milliGRAM(s) Oral at bedtime    MEDICATIONS  (PRN):  magnesium hydroxide Suspension 30 milliLiter(s) Oral daily PRN Constipation  oxyCODONE    IR 2.5 milliGRAM(s) Oral every 6 hours PRN Moderate Pain (4 - 6)  oxyCODONE    IR 5 milliGRAM(s) Oral every 6 hours PRN Severe Pain (7 - 10)      CAPILLARY BLOOD GLUCOSE      POCT Blood Glucose.: 146 mg/dL (2024 09:59)    I&O's Summary    2024 07:01  -  2024 07:00  --------------------------------------------------------  IN: 300 mL / OUT: 300 mL / NET: 0 mL        PHYSICAL EXAM:  Vital Signs Last 24 Hrs  T(C): 36.6 (2024 05:47), Max: 37 (2024 10:02)  T(F): 97.8 (2024 05:47), Max: 98.6 (2024 10:02)  HR: 70 (2024 05:47) (70 - 89)  BP: 121/70 (2024 05:47) (94/61 - 121/70)  BP(mean): --  RR: 18 (2024 05:47) (17 - 21)  SpO2: 100% (2024 05:47) (96% - 100%)    Parameters below as of 2024 05:47  Patient On (Oxygen Delivery Method): room air      CONSTITUTIONAL: Well-groomed, in no apparent distress  RESPIRATORY: Breathing comfortably; no dullness to percussion; lungs CTA without wheeze/rhonchi/rales  CARDIOVASCULAR: +S1S2, RRR, no M/G/R; pedal pulses full and symmetric; no lower extremity edema  GASTROINTESTINAL: Distended but soft. No palpable masses or tenderness, +BS throughout, no rebound/guarding; no hepatosplenomegaly.   SKIN: No rashes or ulcers noted  NEUROLOGIC: A+O x 3.     LABS:                        13.4   11.98 )-----------( 133      ( 2024 11:02 )             38.9     11-03    139  |  100  |  26[H]  ----------------------------<  159[H]  3.5   |  24  |  1.05    Ca    8.9      2024 11:02  Mg     2.30     11-03    TPro  7.0  /  Alb  3.5  /  TBili  8.0[H]  /  DBili  6.5[H]  /  AST  122[H]  /  ALT  183[H]  /  AlkPhos  300[H]  11-03          Urinalysis Basic - ( 2024 15:00 )    Color: Dark Yellow / Appearance: Clear / S.086 / pH: x  Gluc: x / Ketone: Negative mg/dL  / Bili: Moderate / Urobili: 2.0 mg/dL   Blood: x / Protein: 30 mg/dL / Nitrite: Negative   Leuk Esterase: Negative / RBC: 3 /HPF / WBC 0 /HPF   Sq Epi: x / Non Sq Epi: 4 /HPF / Bacteria: Negative /HPF        Urinalysis with Rflx Culture (collected 2024 15:00)        RADIOLOGY & ADDITIONAL TESTS:  Results Reviewed:   Imaging Personally Reviewed:  Electrocardiogram Personally Reviewed:

## 2024-11-04 NOTE — CONSULT NOTE ADULT - ASSESSMENT
Mr. Rodriguez is an 79 year old male with no known PMH who presented with abdominal pain, weakness and unsteady gait. He is admitted with jaundice, abnormal liver chemistry and suspected periampullary mass with biliary obstruction.    #Pancreatic mass  #Biliary obstruction  #Jaundice  CT findings noted above, not yet discussed with patient as of this morning. Concern for pancreatic cancer with malignant biliary obstruction. Low concern for cholangitis.     Recommendations:  - Review of CT findings with patient per primary team. Please notify GI team following conversation  - Will likely benefit from EUS/ERCP   - NPO for possible procedures today pending above  - Check PT/INR, CBC & CMP today  - CT chest for staging    Note writer is post-call on Monday 11/4. Please contact GI fellow Kane Hall for updates.  Mr. Rodriguez is an 79 year old male with no known PMH who presented with abdominal pain, weakness and unsteady gait. He is admitted with jaundice, abnormal liver chemistry and suspected periampullary mass with biliary obstruction.    #Pancreatic mass  #Biliary obstruction  #Jaundice  CT findings noted above, not yet discussed with patient as of this morning. Concern for pancreatic cancer with malignant biliary obstruction. Low concern for cholangitis.     Recommendations:  - Review of CT findings with patient per primary team. Please notify GI team following conversation  - Will likely benefit from EUS/ERCP today  - NPO for possible procedures today pending above  - Check PT/INR, CBC & CMP today  - CT chest for staging    Note writer is post-call on Monday 11/4. Please contact GI fellow Kane Hall for updates.

## 2024-11-04 NOTE — DIETITIAN INITIAL EVALUATION ADULT - OTHER CALCULATIONS
Defer fluid needs to MD/Team.   Ideal Body Weight: 166lbs / 75.4kg +/-10%   Dosing wt used to calculate nutritional estimated needs.

## 2024-11-04 NOTE — PROGRESS NOTE ADULT - PROBLEM SELECTOR PLAN 4
Patient with new onset gait disturbance and history of hand tremors, potentially concerning for Parkinson's disease.    - PT eval   - F/u TSH, B12, B9  - Revaluate gait when patient regains strength Total bilirubin elevated to 8 on admission, cholestatic picture including elevations in GGT, Alk Phos, and LFT's. 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     - F/u direct bili in the AM  - Continue to trend

## 2024-11-04 NOTE — CONSULT NOTE ADULT - ATTENDING COMMENTS
Agree with above.    I saw and examined the patient on 11-04-24. I agree with the findings and plan of care as documented in the fellow/resident/medical student/physician assistant/nurse practitioner.    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  Plan on EGD/EUS/ERCP today.  Will need oncology and surgical oncology evaluation  f/u CT chest as part of workup.    Billing:  I have reviewed records from labs, imaging.    Other:  - Thank you for involving us in the care of this patient. If you have any questions regarding the plan of care please do not hesitate to call us back.    Contact:     Daytime        Available on [employer] Teams        [phone] ([facility] Short Range Pager)        [phone] ([facility] Long Range Pager)     On Weekends/Holidays (All day) and Weekdays after 5 PM to 8AM:        For non-urgent consults, please email [email or web address]        For emergent consults, please contact on call GI team.  See Amion schedule ([facility]), PrePlay paging system ([facility]), or call hospital  ([facility])

## 2024-11-04 NOTE — PROGRESS NOTE ADULT - PROBLEM SELECTOR PLAN 5
Patient with history of fatigue, unclear etiology however likely related to pancreatic mass; hemoglobin wnl     - Encourage PO intake   - F/u nutrition consult   - F/u TSH, B12, B9 levels Patient with new onset gait disturbance and history of hand tremors, potentially concerning for Parkinson's disease.    - PT eval   - F/u TSH, B12, B9: largely unremarkable   - Revaluate gait when patient regains strength

## 2024-11-05 LAB
ALBUMIN SERPL ELPH-MCNC: 3 G/DL — LOW (ref 3.3–5)
ALP SERPL-CCNC: 264 U/L — HIGH (ref 40–120)
ALT FLD-CCNC: 100 U/L — HIGH (ref 4–41)
ANION GAP SERPL CALC-SCNC: 12 MMOL/L — SIGNIFICANT CHANGE UP (ref 7–14)
APTT BLD: 30.6 SEC — SIGNIFICANT CHANGE UP (ref 24.5–35.6)
AST SERPL-CCNC: 40 U/L — SIGNIFICANT CHANGE UP (ref 4–40)
BASOPHILS # BLD AUTO: 0.03 K/UL — SIGNIFICANT CHANGE UP (ref 0–0.2)
BASOPHILS NFR BLD AUTO: 0.2 % — SIGNIFICANT CHANGE UP (ref 0–2)
BILIRUB SERPL-MCNC: 2.7 MG/DL — HIGH (ref 0.2–1.2)
BLD GP AB SCN SERPL QL: NEGATIVE — SIGNIFICANT CHANGE UP
BUN SERPL-MCNC: 18 MG/DL — SIGNIFICANT CHANGE UP (ref 7–23)
CALCIUM SERPL-MCNC: 8.3 MG/DL — LOW (ref 8.4–10.5)
CANCER AG19-9 SERPL-ACNC: 1506 U/ML — HIGH
CHLORIDE SERPL-SCNC: 103 MMOL/L — SIGNIFICANT CHANGE UP (ref 98–107)
CO2 SERPL-SCNC: 25 MMOL/L — SIGNIFICANT CHANGE UP (ref 22–31)
CREAT SERPL-MCNC: 0.76 MG/DL — SIGNIFICANT CHANGE UP (ref 0.5–1.3)
EGFR: 90 ML/MIN/1.73M2 — SIGNIFICANT CHANGE UP
EOSINOPHIL # BLD AUTO: 0.19 K/UL — SIGNIFICANT CHANGE UP (ref 0–0.5)
EOSINOPHIL NFR BLD AUTO: 1.6 % — SIGNIFICANT CHANGE UP (ref 0–6)
GLUCOSE SERPL-MCNC: 111 MG/DL — HIGH (ref 70–99)
HCT VFR BLD CALC: 38 % — LOW (ref 39–50)
HGB BLD-MCNC: 13 G/DL — SIGNIFICANT CHANGE UP (ref 13–17)
IANC: 10.08 K/UL — HIGH (ref 1.8–7.4)
IMM GRANULOCYTES NFR BLD AUTO: 1.5 % — HIGH (ref 0–0.9)
INR BLD: 1.06 RATIO — SIGNIFICANT CHANGE UP (ref 0.85–1.16)
LYMPHOCYTES # BLD AUTO: 1.22 K/UL — SIGNIFICANT CHANGE UP (ref 1–3.3)
LYMPHOCYTES # BLD AUTO: 10 % — LOW (ref 13–44)
MAGNESIUM SERPL-MCNC: 2.5 MG/DL — SIGNIFICANT CHANGE UP (ref 1.6–2.6)
MCHC RBC-ENTMCNC: 28.6 PG — SIGNIFICANT CHANGE UP (ref 27–34)
MCHC RBC-ENTMCNC: 34.2 G/DL — SIGNIFICANT CHANGE UP (ref 32–36)
MCV RBC AUTO: 83.5 FL — SIGNIFICANT CHANGE UP (ref 80–100)
MONOCYTES # BLD AUTO: 0.46 K/UL — SIGNIFICANT CHANGE UP (ref 0–0.9)
MONOCYTES NFR BLD AUTO: 3.8 % — SIGNIFICANT CHANGE UP (ref 2–14)
NEUTROPHILS # BLD AUTO: 10.08 K/UL — HIGH (ref 1.8–7.4)
NEUTROPHILS NFR BLD AUTO: 82.9 % — HIGH (ref 43–77)
NRBC # BLD: 0 /100 WBCS — SIGNIFICANT CHANGE UP (ref 0–0)
NRBC # FLD: 0 K/UL — SIGNIFICANT CHANGE UP (ref 0–0)
PHOSPHATE SERPL-MCNC: 3.1 MG/DL — SIGNIFICANT CHANGE UP (ref 2.5–4.5)
PLATELET # BLD AUTO: 165 K/UL — SIGNIFICANT CHANGE UP (ref 150–400)
POTASSIUM SERPL-MCNC: 3.8 MMOL/L — SIGNIFICANT CHANGE UP (ref 3.5–5.3)
POTASSIUM SERPL-SCNC: 3.8 MMOL/L — SIGNIFICANT CHANGE UP (ref 3.5–5.3)
PROT SERPL-MCNC: 6.5 G/DL — SIGNIFICANT CHANGE UP (ref 6–8.3)
PROTHROM AB SERPL-ACNC: 12.6 SEC — SIGNIFICANT CHANGE UP (ref 9.9–13.4)
RBC # BLD: 4.55 M/UL — SIGNIFICANT CHANGE UP (ref 4.2–5.8)
RBC # FLD: 14.3 % — SIGNIFICANT CHANGE UP (ref 10.3–14.5)
RH IG SCN BLD-IMP: POSITIVE — SIGNIFICANT CHANGE UP
SODIUM SERPL-SCNC: 140 MMOL/L — SIGNIFICANT CHANGE UP (ref 135–145)
WBC # BLD: 12.16 K/UL — HIGH (ref 3.8–10.5)
WBC # FLD AUTO: 12.16 K/UL — HIGH (ref 3.8–10.5)

## 2024-11-05 PROCEDURE — 43264 ERCP REMOVE DUCT CALCULI: CPT

## 2024-11-05 PROCEDURE — 76376 3D RENDER W/INTRP POSTPROCES: CPT | Mod: 26

## 2024-11-05 PROCEDURE — 99233 SBSQ HOSP IP/OBS HIGH 50: CPT | Mod: GC

## 2024-11-05 PROCEDURE — 93306 TTE W/DOPPLER COMPLETE: CPT | Mod: 26

## 2024-11-05 PROCEDURE — 43262 ENDO CHOLANGIOPANCREATOGRAPH: CPT

## 2024-11-05 PROCEDURE — 93356 MYOCRD STRAIN IMG SPCKL TRCK: CPT

## 2024-11-05 PROCEDURE — 43259 EGD US EXAM DUODENUM/JEJUNUM: CPT

## 2024-11-05 PROCEDURE — 74183 MRI ABD W/O CNTR FLWD CNTR: CPT | Mod: 26

## 2024-11-05 DEVICE — CATH BLLN EXTRACT DIST GUIDE WIRE 15MM 3LUM: Type: IMPLANTABLE DEVICE | Status: FUNCTIONAL

## 2024-11-05 DEVICE — IMPLANTABLE DEVICE: Type: IMPLANTABLE DEVICE | Status: FUNCTIONAL

## 2024-11-05 DEVICE — GWIRE JAGTOME REVOLUTION RX 260CM/0.025IN: Type: IMPLANTABLE DEVICE | Status: FUNCTIONAL

## 2024-11-05 RX ADMIN — Medication 40 MILLIGRAM(S): at 21:50

## 2024-11-05 RX ADMIN — Medication 6 MILLIGRAM(S): at 21:42

## 2024-11-05 RX ADMIN — Medication 2 TABLET(S): at 21:49

## 2024-11-05 RX ADMIN — POLYETHYLENE GLYCOL 3350 17 GRAM(S): 17 POWDER, FOR SOLUTION ORAL at 05:05

## 2024-11-05 NOTE — PROGRESS NOTE ADULT - PROBLEM SELECTOR PLAN 4
Total bilirubin elevated to 8 on admission, cholestatic picture including elevations in GGT, Alk Phos, and LFT's. 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     - F/u direct bili in the AM  - Continue to trend Total bilirubin elevated to 8 on admission, cholestatic picture including elevations in GGT, Alk Phos, and LFT's. 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     - Continue to trend: downtrending   - see pancreatic mass Total bilirubin elevated to 8 on admission, cholestatic picture including elevations in GGT, Alk Phos, and LFT's. 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     - Continue to trend: downtrending   - see pancreatic mass, likely iso of choledocho

## 2024-11-05 NOTE — PROGRESS NOTE ADULT - PROBLEM SELECTOR PLAN 3
Patient with history of not being to sleep flat, and new voice hoarseness, with no lower extremity edema. Unlikely to be CHF, however cannot rule out at this point. Pt describes SOB when laying on abdomen and not flat on back over the last three days, less concern for acute cardio/pulm etiology. Rule out PE iso elevated D-dimer, and potential malignancy.     - F/u TTE results  - PT eval  - CXR: bibasilar atelactasis  - D-dimer: 2971 --> f/u CTPA (11/4) to rule out PE iso potential malignancy though vitals WNL and no chest pain. Patient with history of not being to sleep flat, and new voice hoarseness, with no lower extremity edema. Unlikely to be CHF, however cannot rule out at this point. Pt describes SOB when laying on abdomen and not flat on back over the last three days, less concern for acute cardio/pulm etiology. Rule out PE iso elevated D-dimer, and potential malignancy.     s/p CTPA (11/4): no PE or masses visualized, elevated D-dimer likely iso malignancy     - F/u TTE results  - PT eval  - CXR: bibasilar atelactasis Patient with history of not being to sleep flat, and new voice hoarseness, with no lower extremity edema. Unlikely to be CHF, however cannot rule out at this point. Pt describes SOB when laying on abdomen and not flat on back over the last three days, less concern for acute cardio/pulm etiology. Rule out PE iso elevated D-dimer, and potential malignancy. Can be due to abdominal distension and positioning.     s/p CTPA (11/4): no PE or masses visualized, elevated D-dimer likely iso malignancy     - F/u TTE results: EF 55% and grade 1 diastolic dysfunction.   - PT eval: outpatient PT   - CXR: bibasilar atelactasis

## 2024-11-05 NOTE — PHYSICAL THERAPY INITIAL EVALUATION ADULT - REHAB POTENTIAL, PT EVAL
INR 1.7    Description          3/1 INR 1.7- warfarin 1mg MTH 1.5mg rest of the days, recheck in 2 weeks          
No change  Recheck one month  
Patient's  is aware and understands    Description          3/15 INR 1.7 warfarin 1mg Monday and Thursday, 1.5mg the rest of the days, recheck in one month          
good, to achieve stated therapy goals

## 2024-11-05 NOTE — DISCHARGE NOTE PROVIDER - NSDCFUADDAPPT_GEN_ALL_CORE_FT
APPTS ARE READY TO BE MADE: [x] YES    Best Family or Patient Contact (if needed):    Additional Information about above appointments (if needed):    1: PCP within 2 weeks of discharge  2: Hepatology within 2 weeks of discharge   3: Surgery with Dr. Ramirez within 2 weeks of discharge    Other comments or requests:    APPTS ARE READY TO BE MADE: [x] YES    Best Family or Patient Contact (if needed):    Additional Information about above appointments (if needed):    1: PCP within 2 weeks of discharge  2: Hepatology within 2 weeks of discharge   3: Surgery with Dr. Ramirez within 2 weeks of discharge    Other comments or requests:     Provided patient with provider referral information, however patient prefers to schedule the appointments with the help of their home aide.   APPTS ARE READY TO BE MADE: [x] YES    Best Family or Patient Contact (if needed):    Additional Information about above appointments (if needed):    1: PCP within 2 weeks of discharge  2: Hepatology within 2 weeks of discharge   3: Surgery with Dr. Ramirez within 2 weeks of discharge    Other comments or requests:     Provided patient with provider referral information, however patient prefers to schedule the appointments with the help of their home aide.    A [facility] office was contacted to secure an appointment, however the office will follow up with the patient/caregiver directly.

## 2024-11-05 NOTE — PHYSICAL THERAPY INITIAL EVALUATION ADULT - IMPAIRED TRANSFERS: SIT/STAND, REHAB EVAL
LVM, will discuss with patient at upcoming appt   decreased strength No Repair - Repaired With Adjacent Surgical Defect Text (Leave Blank If You Do Not Want): After obtaining clear surgical margins the defect was repaired concurrently with another surgical defect which was in close approximation.

## 2024-11-05 NOTE — PHYSICAL THERAPY INITIAL EVALUATION ADULT - PHYSICAL ASSIST/NONPHYSICAL ASSIST: GAIT, REHAB EVAL
Left a voice message to call back we can add echo with strain and then new patient apt with INOCENCIA Rios on 12/20/22 @8 am.   verbal cues/nonverbal cues (demo/gestures)/1 person assist Statement Selected

## 2024-11-05 NOTE — PROGRESS NOTE ADULT - ASSESSMENT
Patient is an 79 year old male with no significant past medical history, presenting to the ED for history of abdominal pain and weakness. Labs and imaging were reflective of pancreatic mass with biliary obstruction, concerning for pancreatic malignancy, complicated by orthopnea, now admitted for further work up with GI following for biopsy via EUS/ERCP.  Patient is an 79 year old male with no significant past medical history, presenting to the ED for history of abdominal pain and weakness. Labs and CT A/P were initially reflective of pancreatic mass with biliary obstruction, concerning for pancreatic malignancy, complicated by orthopnea, with MRCP and daily downtrending bilirubin now with less concern for pancreatic mass and more concern for choledocholithiasis  now admitted for further work up with GI following for potential EUS/ERCP.

## 2024-11-05 NOTE — PROGRESS NOTE ADULT - PROBLEM SELECTOR PLAN 8
- Fluids: LR @50 cc/hr for 20 hours  - Electrolytes: Will replete to maintain K>4, Phos>3, and Mag>2  - Nutrition: Regular diet   - DVT Prophylaxis: Lovenox   - Disposition: Pending PT eval - Fluids: s/p LR @50 cc/hr for 20 hours  - Electrolytes: Will replete to maintain K>4, Phos>3, and Mag>2  - Nutrition: Regular diet   - DVT Prophylaxis: Lovenox   - Disposition: Pending PT eval

## 2024-11-05 NOTE — PROGRESS NOTE ADULT - PROBLEM SELECTOR PLAN 6
Patient with history of fatigue, unclear etiology however likely related to pancreatic mass; hemoglobin wnl     - Encourage PO intake   - F/u nutrition consult   - F/u TSH, B12, B9 levels: largely unremarkable Patient with history of fatigue, unclear etiology however likely related to pancreatic mass; hemoglobin wnl     - Encourage PO intake   - F/u nutrition consult: lactose free diet, increase protein intake, orgain vegan vanilla 2x daily    - F/u TSH, B12, B9 levels: largely unremarkable

## 2024-11-05 NOTE — DISCHARGE NOTE PROVIDER - NSFOLLOWUPCLINICS_GEN_ALL_ED_FT
[facility]  Internal Medicine  [address] [place] [address]  Phone: [phone]  Fax: [phone]    [facility]  Gastroenterology  [address] [place] [address]  Phone: [phone]  Fax:

## 2024-11-05 NOTE — PROGRESS NOTE ADULT - PROBLEM SELECTOR PLAN 1
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 Elevated alk phos, T. bili, and GGT concerning for cholestatic picture.     - F/u GI recs: plan for ERCP, EUS on 11/5, CT chest for staging, will need oncology and surgical oncology evaluation  - f/u: CEA, CA 19-9 Periampullary mass found on CTAP, with intrahepatic bile duct dilation. Elevated alk phos, T. bili, and GGT concerning for cholestatic picture.     - F/u GI recs: plan for ERCP, EUS on 11/5, CT chest for staging (no masses visualized on CTPA), will need oncology and surgical oncology evaluation  - f/u: CEA WNL, CA 19-9: 1506 Periampullary mass found on CTAP, with intrahepatic bile duct dilation. Elevated alk phos, T. bili, and GGT concerning for cholestatic picture. MRCP with concern for obstructive two choledocholithiasis with intra and extrahepatic biliary ductal dilatation, and in the context of daily downtrending bili, may make pancreatic mass seen on initial CTAP less likely to represent malignant mass but rather two obstructing stones together.     - F/u GI recs: plan for ERCP, EUS on 11/5, CT chest for staging (no masses visualized on CTPA), will need oncology and surgical oncology evaluation  - f/u: CEA WNL, CA 19-9: 1506

## 2024-11-05 NOTE — PROGRESS NOTE ADULT - SUBJECTIVE AND OBJECTIVE BOX
PROGRESS NOTE:   Authored by Leanne Martinez MD  Internal Medicine Resident Physician, PGY-1      Patient is a 79y old  Male who presents with a chief complaint of Abdominal pain, weakness (2024 14:30)      SUBJECTIVE / OVERNIGHT EVENTS: Patient seen and examined at bedside. Feels better than yesterday. Does not endorse abdominal pain, chest pain, SOB, nausea. Endorses early satiety, has no appetite, and does have constipation (attributes to psychosocial factor of being in the hospital and lack of privacy).      MEDICATIONS  (STANDING):  chlorhexidine 2% Cloths 1 Application(s) Topical daily  enoxaparin Injectable 40 milliGRAM(s) SubCutaneous every 24 hours  influenza  Vaccine (HIGH DOSE) 0.5 milliLiter(s) IntraMuscular once  lactated ringers. 1000 milliLiter(s) (50 mL/Hr) IV Continuous <Continuous>  lidocaine   4% Patch 1 Patch Transdermal daily  melatonin 6 milliGRAM(s) Oral at bedtime    MEDICATIONS  (PRN):  magnesium hydroxide Suspension 30 milliLiter(s) Oral daily PRN Constipation  oxyCODONE    IR 2.5 milliGRAM(s) Oral every 6 hours PRN Moderate Pain (4 - 6)  oxyCODONE    IR 5 milliGRAM(s) Oral every 6 hours PRN Severe Pain (7 - 10)      CAPILLARY BLOOD GLUCOSE      POCT Blood Glucose.: 146 mg/dL (2024 09:59)    I&O's Summary    2024 07:01  -  2024 07:00  --------------------------------------------------------  IN: 300 mL / OUT: 300 mL / NET: 0 mL        PHYSICAL EXAM:  Vital Signs Last 24 Hrs  T(C): 36.6 (2024 05:47), Max: 37 (2024 10:02)  T(F): 97.8 (2024 05:47), Max: 98.6 (2024 10:02)  HR: 70 (2024 05:47) (70 - 89)  BP: 121/70 (2024 05:47) (94/61 - 121/70)  BP(mean): --  RR: 18 (2024 05:47) (17 - 21)  SpO2: 100% (2024 05:47) (96% - 100%)    Parameters below as of 2024 05:47  Patient On (Oxygen Delivery Method): room air      CONSTITUTIONAL: Well-groomed, in no apparent distress  RESPIRATORY: Breathing comfortably; no dullness to percussion; lungs CTA without wheeze/rhonchi/rales  CARDIOVASCULAR: +S1S2, RRR, no M/G/R; pedal pulses full and symmetric; no lower extremity edema  GASTROINTESTINAL: Distended but soft. No palpable masses or tenderness, +BS throughout, no rebound/guarding; no hepatosplenomegaly.   SKIN: No rashes or ulcers noted  NEUROLOGIC: A+O x 3.     LABS:                        13.4   11.98 )-----------( 133      ( 2024 11:02 )             38.9     11-03    139  |  100  |  26[H]  ----------------------------<  159[H]  3.5   |  24  |  1.05    Ca    8.9      2024 11:02  Mg     2.30     11-03    TPro  7.0  /  Alb  3.5  /  TBili  8.0[H]  /  DBili  6.5[H]  /  AST  122[H]  /  ALT  183[H]  /  AlkPhos  300[H]  11-03          Urinalysis Basic - ( 2024 15:00 )    Color: Dark Yellow / Appearance: Clear / S.086 / pH: x  Gluc: x / Ketone: Negative mg/dL  / Bili: Moderate / Urobili: 2.0 mg/dL   Blood: x / Protein: 30 mg/dL / Nitrite: Negative   Leuk Esterase: Negative / RBC: 3 /HPF / WBC 0 /HPF   Sq Epi: x / Non Sq Epi: 4 /HPF / Bacteria: Negative /HPF        Urinalysis with Rflx Culture (collected 2024 15:00)        RADIOLOGY & ADDITIONAL TESTS:  Results Reviewed:   Imaging Personally Reviewed:  Electrocardiogram Personally Reviewed:   CHIEF COMPLAINT: abdominal pain and weakness    Overnight Events: None  Interval Events: CTPA negative for PE, atelectasis, no visualized lung mass    Subjective: Patient at procedure this morning. Unable to elicit.     OBJECTIVE:  ICU Vital Signs Last 24 Hrs  T(C): 36.7 (05 Nov 2024 06:10), Max: 36.7 (04 Nov 2024 22:07)  T(F): 98 (05 Nov 2024 06:10), Max: 98 (04 Nov 2024 22:07)  HR: 73 (05 Nov 2024 06:10) (73 - 78)  BP: 117/79 (05 Nov 2024 06:10) (114/74 - 117/79)  BP(mean): --  ABP: --  ABP(mean): --  RR: 18 (05 Nov 2024 06:10) (17 - 18)  SpO2: 98% (05 Nov 2024 06:10) (98% - 99%)    O2 Parameters below as of 05 Nov 2024 06:10  Patient On (Oxygen Delivery Method): room air      POCT Blood Glucose.: 146 mg/dL (03 Nov 2024 09:59)      PHYSICAL EXAM  General:   Head:    Eyes:   Neck:   Cardiac:   Lungs:   Abdomen:   Extremities:   Neuro  Psych:   Skin:  Etc:      HOSPITAL MEDICATIONS:  MEDICATIONS  (STANDING):  chlorhexidine 2% Cloths 1 Application(s) Topical daily  enoxaparin Injectable 40 milliGRAM(s) SubCutaneous every 24 hours  influenza  Vaccine (HIGH DOSE) 0.5 milliLiter(s) IntraMuscular once  lactated ringers. 1000 milliLiter(s) (50 mL/Hr) IV Continuous <Continuous>  lidocaine   4% Patch 1 Patch Transdermal daily  melatonin 6 milliGRAM(s) Oral at bedtime  polyethylene glycol 3350 17 Gram(s) Oral two times a day  senna 2 Tablet(s) Oral at bedtime    MEDICATIONS  (PRN):  magnesium hydroxide Suspension 30 milliLiter(s) Oral daily PRN Constipation  oxyCODONE    IR 5 milliGRAM(s) Oral every 6 hours PRN Severe Pain (7 - 10)  oxyCODONE    IR 2.5 milliGRAM(s) Oral every 6 hours PRN Moderate Pain (4 - 6)      LABS:                        13.0   12.16 )-----------( 165      ( 05 Nov 2024 06:30 )             38.0     Hgb Trend: 13.0<--, 12.8<--, 13.4<--  11-05    140  |  103  |  18  ----------------------------<  111[H]  3.8   |  25  |  0.76    Ca    8.3[L]      05 Nov 2024 06:30  Phos  3.1     11-05  Mg     2.50     11-05    TPro  6.5  /  Alb  3.0[L]  /  TBili  2.7[H]  /  DBili  x   /  AST  40  /  ALT  100[H]  /  AlkPhos  264[H]  11-05    Creatinine Trend: 0.76<--, 0.81<--, 1.05<--  PT/INR - ( 05 Nov 2024 06:30 )   PT: 12.6 sec;   INR: 1.06 ratio         PTT - ( 05 Nov 2024 06:30 )  PTT:30.6 sec  Urinalysis Basic - ( 05 Nov 2024 06:30 )    Color: x / Appearance: x / SG: x / pH: x  Gluc: 111 mg/dL / Ketone: x  / Bili: x / Urobili: x   Blood: x / Protein: x / Nitrite: x   Leuk Esterase: x / RBC: x / WBC x   Sq Epi: x / Non Sq Epi: x / Bacteria: x            MICROBIOLOGY:     Urinalysis with Rflx Culture (collected 03 Nov 2024 15:00)        RADIOLOGY:  [x] Reviewed by me CHIEF COMPLAINT: abdominal pain and weakness    Overnight Events: None  Interval Events: CTPA negative for PE, atelectasis, no visualized lung mass    Subjective: Patient at procedure this morning. Unable to elicit.   No F/C, N/V, CP, SOB, Cough, lightheadedness, dizziness, abdominal pain, diarrhea, dysuria.      OBJECTIVE:  ICU Vital Signs Last 24 Hrs  T(C): 36.7 (05 Nov 2024 06:10), Max: 36.7 (04 Nov 2024 22:07)  T(F): 98 (05 Nov 2024 06:10), Max: 98 (04 Nov 2024 22:07)  HR: 73 (05 Nov 2024 06:10) (73 - 78)  BP: 117/79 (05 Nov 2024 06:10) (114/74 - 117/79)  BP(mean): --  ABP: --  ABP(mean): --  RR: 18 (05 Nov 2024 06:10) (17 - 18)  SpO2: 98% (05 Nov 2024 06:10) (98% - 99%)    O2 Parameters below as of 05 Nov 2024 06:10  Patient On (Oxygen Delivery Method): room air      POCT Blood Glucose.: 146 mg/dL (03 Nov 2024 09:59)      PHYSICAL EXAM  General:   Head:    Eyes:   Neck:   Cardiac:   Lungs:   Abdomen:   Extremities:   Neuro  Psych:   Skin:  Etc:      HOSPITAL MEDICATIONS:  MEDICATIONS  (STANDING):  chlorhexidine 2% Cloths 1 Application(s) Topical daily  enoxaparin Injectable 40 milliGRAM(s) SubCutaneous every 24 hours  influenza  Vaccine (HIGH DOSE) 0.5 milliLiter(s) IntraMuscular once  lactated ringers. 1000 milliLiter(s) (50 mL/Hr) IV Continuous <Continuous>  lidocaine   4% Patch 1 Patch Transdermal daily  melatonin 6 milliGRAM(s) Oral at bedtime  polyethylene glycol 3350 17 Gram(s) Oral two times a day  senna 2 Tablet(s) Oral at bedtime    MEDICATIONS  (PRN):  magnesium hydroxide Suspension 30 milliLiter(s) Oral daily PRN Constipation  oxyCODONE    IR 5 milliGRAM(s) Oral every 6 hours PRN Severe Pain (7 - 10)  oxyCODONE    IR 2.5 milliGRAM(s) Oral every 6 hours PRN Moderate Pain (4 - 6)      LABS:                        13.0   12.16 )-----------( 165      ( 05 Nov 2024 06:30 )             38.0     Hgb Trend: 13.0<--, 12.8<--, 13.4<--  11-05    140  |  103  |  18  ----------------------------<  111[H]  3.8   |  25  |  0.76    Ca    8.3[L]      05 Nov 2024 06:30  Phos  3.1     11-05  Mg     2.50     11-05    TPro  6.5  /  Alb  3.0[L]  /  TBili  2.7[H]  /  DBili  x   /  AST  40  /  ALT  100[H]  /  AlkPhos  264[H]  11-05    Creatinine Trend: 0.76<--, 0.81<--, 1.05<--  PT/INR - ( 05 Nov 2024 06:30 )   PT: 12.6 sec;   INR: 1.06 ratio         PTT - ( 05 Nov 2024 06:30 )  PTT:30.6 sec  Urinalysis Basic - ( 05 Nov 2024 06:30 )    Color: x / Appearance: x / SG: x / pH: x  Gluc: 111 mg/dL / Ketone: x  / Bili: x / Urobili: x   Blood: x / Protein: x / Nitrite: x   Leuk Esterase: x / RBC: x / WBC x   Sq Epi: x / Non Sq Epi: x / Bacteria: x            MICROBIOLOGY:     Urinalysis with Rflx Culture (collected 03 Nov 2024 15:00)        RADIOLOGY:  [x] Reviewed by me

## 2024-11-05 NOTE — DISCHARGE NOTE PROVIDER - NSDCCPTREATMENT_GEN_ALL_CORE_FT
PRINCIPAL PROCEDURE  Procedure: ERCP  Findings and Treatment:      PRINCIPAL PROCEDURE  Procedure: ERCP  Findings and Treatment: - Cholangitis with large impacted ampullary stone, and multiple other smaller biliary stones.  - Successful creation of fistulotomy from duodenum to bile duct and extraction of the biliary stones, and purulent material.  - All stones were removed from the bile duct.  - Enterosocpy performed to capture the large extracted stone. The stone was fragmented during enteroscopy therefore was not removed.      SECONDARY PROCEDURE  Procedure: Complete transthoracic echocardiography (TTE)  Findings and Treatment:      PRINCIPAL PROCEDURE  Procedure: ERCP  Findings and Treatment: Cholangitis with large impacted ampullary stone, and multiple other smaller biliary stones.  Successful creation of fistulotomy from duodenum to bile duct and extraction of the biliary stones, and purulent material.  All stones were removed from the bile duct.  Enterosocpy performed to capture the large extracted stone. The stone was fragmented during enteroscopy therefore was not removed.      SECONDARY PROCEDURE  Procedure: Upper GI endoscopy with endoscopic ultrasound  Findings and Treatment: ENDOSCOPIC FINDING: :  The examined esophagus was endoscopically normal. The entire examined stomach was normal. There was extrinsic compression on the lesser curvature of the stomach. This was later found to be from liver cyst. The examined duodenum was endoscopically normal. Ampulla mucosa appeared normal but was bulging indicated an impacted stone.    Procedure: Complete transthoracic echocardiography (TTE)  Findings and Treatment: 1. Left ventricular cavity is normal in size. Left ventricular wall thickness is normal. Left ventricular systolic function is normal with an ejection fraction of 55 % by 3D. There are no regional wall motion abnormalities seen.  2. There is mild (grade 1) left ventricular diastolic dysfunction.   3. Left ventricular global longitudinal strain is -17.8 % is borderline (range: -18 to -16%). Images were acquired on a [employer] ultrasound system and processed on the ultrasound machine with a heart rate of 68 bpm and a blood pressure of 117/79 mmHg.   4. Normal right ventricular cavity size and normal right ventricular systolic function. Tricuspid annular plane systolic excursion (TAPSE) is 2.4 cm (normal >=1.7 cm).   5. Structurally normal mitral valve with normal leaflet excursion. There is calcification of the mitral valve annulus. There is trace mitral regurgitation.

## 2024-11-05 NOTE — PROGRESS NOTE ADULT - PROBLEM SELECTOR PLAN 5
Patient with new onset gait disturbance and history of hand tremors, potentially concerning for Parkinson's disease.    - PT eval   - F/u TSH, B12, B9: largely unremarkable   - Revaluate gait when patient regains strength Patient with new onset gait disturbance and history of hand tremors, potentially concerning for Parkinson's disease.    - PT eval: outpatient PT   - F/u TSH, B12, B9: largely unremarkable   - Revaluate gait when patient regains strength

## 2024-11-05 NOTE — DISCHARGE NOTE PROVIDER - YES NO FOR MLM POSITIVE OR NEGATIVE COVID RESULT
, Imiquimod Counseling:  I discussed with the patient the risks of imiquimod including but not limited to erythema, scaling, itching, weeping, crusting, and pain.  Patient understands that the inflammatory response to imiquimod is variable from person to person and was educated regarded proper titration schedule.  If flu-like symptoms develop, patient knows to discontinue the medication and contact us.

## 2024-11-05 NOTE — PHYSICAL THERAPY INITIAL EVALUATION ADULT - ADDITIONAL COMMENTS
Patient lives with his elderly girlfriend in a house. No stairs. Independent ambulation and ADLs.     After session, patient left semi supine in bed with all lines intact in NAD.

## 2024-11-05 NOTE — DISCHARGE NOTE PROVIDER - CARE PROVIDER_API CALL
Payton Ramirez  Surgery  [address] [place] [id number]  Phone: [phone]  Fax: [phone]  Follow Up Time: 2 weeks

## 2024-11-05 NOTE — PHYSICAL THERAPY INITIAL EVALUATION ADULT - PERTINENT HX OF CURRENT PROBLEM, REHAB EVAL
Patient is an 79 year old male with no significant past medical history, presenting to the ED for history of abdominal pain and weakness. Labs and imaging were reflective of pancreatic mass with biliary obstruction, concerning for pancreatic malignancy, complicated by orthopnea, now admitted for further work up with GI following for biopsy via EUS/ERCP.

## 2024-11-05 NOTE — DISCHARGE NOTE PROVIDER - NSDCCPCAREPLAN_GEN_ALL_CORE_FT
PRINCIPAL DISCHARGE DIAGNOSIS  Diagnosis: Cholangitis due to bile duct calculus with obstruction  Assessment and Plan of Treatment: You initially came in with abdominal pain, decreased appetite, and weakness with advanced imaging of your belly performed revealing gallstones that were blocking flow of a substance produced by your liver, called bile, which gets stored in the organ near your liver, called the gallbladder, eventually normally getting released to enter your digestive tract to help with digestion. However, sometimes the components that make up the bile produced by the liver aren't produced in the perfect ratio to keep it liquid, and can lead to stone formation, such as what was seen in your case, formerly called a gallstone.  A procedure was done with the GI team to better visualize the gallbladder/liver/digestive tract and they were able to remove the stones they saw, along with some other inflammatory debris that formed, and broke down another stone, which your body will natuarlly pass. Since the stones you had formed got stuck on their way down from the liver and gall bladder to the digestive tract, this predisposed you to some back up of substances and inflammation, called cholangitis, which required us to start you on antibiotics to properly treat this inflammation and prevent infection from occuring and spreading.    Since the gallbladder stores the bile made by the liver before it makes its way down into the rest of your digestive tract, it is a place where stone formation is most likely to occur, which is why surgery came to evaluate you for potential to take the gallbladder out and prevent the likelihood of more stones forming and getting stuck. However, there was a lot of inflammation from the previous stones you had formed that were previusly stuck, so they reccomended you to follow up with them after some time has passed to allow for the inflammation to go down, for eventual removal of your gallbladder to be easier. In the meantime, the surgery team reccomended you take a medication called Actigall, which works to prevent future stones from forming.   Please come back to the     PRINCIPAL DISCHARGE DIAGNOSIS  Diagnosis: Cholangitis due to bile duct calculus with obstruction  Assessment and Plan of Treatment: You initially came in with abdominal pain, decreased appetite, and weakness with imaging of your belly performed revealing gallstones that were blocking flow of a substance produced by your liver, called bile, which gets stored in the organ near your liver, called the gallbladder, eventually normally getting released to enter your digestive tract to help with digestion. Sometimes the components that make up the bile produced by the liver aren't produced in the perfect ratio to keep it liquid, and can lead to stone formation, such as what was seen in your case, formerly called a gallstone. A procedure was done with the GI team to better visualize the gallbladder/liver/digestive tract and they were able to remove the stones they saw, along with some other inflammatory debris that formed, and broke down another stone, which your body will natuarlly pass. Since the stones you had formed got stuck on their way down from the liver and gall bladder to the digestive tract, this predisposed you to some back up of substances and inflammation, called cholangitis, which required us to start you on antibiotics to properly treat this inflammation and prevent infection from occuring and spreading. Since the gallbladder stores the bile made by the liver before it makes its way down into the rest of your digestive tract, it is a place where stone formation is most likely to occur, which is why surgery came to evaluate you for potential to take the gallbladder out and prevent the likelihood of more stones forming and getting stuck. However, there was a lot of inflammation from the previous stones you had formed that were previusly stuck, so they reccomended you to follow up with them after some time has passed to allow for the inflammation to go down, for eventual removal of your gallbladder to be easier. In the meantime, the surgery team reccomended you take a medication called Actigall, which works to prevent future stones from forming.   Please come back to the ED if you are experi     PRINCIPAL DISCHARGE DIAGNOSIS  Diagnosis: Cholangitis due to bile duct calculus with obstruction  Assessment and Plan of Treatment: You initially came in with abdominal pain, low appetite, and weakness. Imaging showed gallstones blocking bile flow from your liver to your digestive tract. Bile, produced by the liver and stored in the gallbladder, aids digestion, but an imbalance in bile components can lead to stone formation, as in your case.  The GI team performed a procedure to remove the stones and inflammatory debris and broke down another stone for natural passage. The blockage caused a bile backup and inflammation, known as cholangitis, which we treated with antibiotics to prevent infection.  Since gallstones are most likely to form in the gallbladder, surgery assessed whether removing it could prevent future stones. Due to inflammation, they recommended waiting for a follow-up after it subsides. Meanwhile, they advised taking Actigall to reduce the risk of new stones.  Please return to the hospital if you are experiencing severe or persistent abdominal pain, nausea and vomitting that is not improving, severe diarrhea, or inablilty to have a bowel movement/pass gas.     PRINCIPAL DISCHARGE DIAGNOSIS  Diagnosis: Cholangitis due to bile duct calculus with obstruction  Assessment and Plan of Treatment: You initially came in with abdominal pain, low appetite, and weakness. Imaging showed gallstones blocking bile flow from your liver to your digestive tract. Bile, produced by the liver and stored in the gallbladder, aids digestion, but an imbalance in bile components can lead to stone formation, as in your case.  The GI team performed a procedure to remove the stones and inflammatory debris and broke down another stone for natural passage. The blockage caused a bile backup and inflammation, known as cholangitis, which we treated with antibiotics to prevent infection.  Since gallstones are most likely to form in the gallbladder, surgery assessed whether removing it could prevent future stones. Due to inflammation, they recommended waiting for a follow-up after it subsides. Meanwhile, they advised taking Actigall to reduce the risk of new stones.  Please return to the hospital if you are experiencing severe or persistent abdominal pain, nausea and vomitting that is not improving, severe diarrhea, or inablilty to have a bowel movement/pass gas.      SECONDARY DISCHARGE DIAGNOSES  Diagnosis: Liver cyst  Assessment and Plan of Treatment: A 5 cm liver cyst was found on imaging. Please follow up with your primary care physician and hepatology for monitoring.

## 2024-11-05 NOTE — PROGRESS NOTE ADULT - PROBLEM SELECTOR PLAN 2
Patient with week-long history of migratory abdominal pain, likely related to mass found on imaging. Constipation can be another contributing factor as abdomen is distended on exam, CT abdomen revealed stool burden, and patient reports he is constipated. Low concern for peritonitis at this time, serial abdominal exams deferred.     - oxy 2.5 q6 PRN for mild and oxy 5q6 for moderate/severe pain   - F/u GI recs: see pancreatic mass  - Trend LFT's, bilirubin  - Bowel regimen for constipation Patient with week-long history of migratory abdominal pain, likely related to mass found on imaging. Constipation can be another contributing factor as abdomen is distended on exam, CT abdomen revealed stool burden, and patient reports he is constipated. Low concern for peritonitis at this time, serial abdominal exams deferred.     - oxy 2.5 q6 PRN for mild and oxy 5q6 for moderate/severe pain   - F/u GI recs: see pancreatic mass  - Trend LFT's, bilirubin: downtrending   - Bowel regimen for constipation Patient with week-long history of migratory abdominal pain, likely related to mass found on imaging. Constipation can be another contributing factor as abdomen is distended on exam, CT abdomen revealed stool burden, and patient reports he is constipated. Low concern for peritonitis at this time, serial abdominal exams deferred. MRCP showing concern for choledocholithiasis.    - oxy 2.5 q6 PRN for mild and oxy 5q6 for moderate/severe pain   - F/u GI recs: see pancreatic mass  - Trend LFT's, bilirubin: downtrending --> more consistent with choledocholithiasis than permanent obstruction in the form of a mass  - Bowel regimen for constipation

## 2024-11-05 NOTE — DISCHARGE NOTE PROVIDER - HOSPITAL COURSE
HPI:  Patient is an 79 year old male with no significant past medial history presenting to the ED with a week long history of vague abdominal pain that travels throughout his abdomen that he describes as "cutting across his abdomen", and weakness that has been bothering him for the past week. He has been struggling to get out of bed for the past week limited by his weakness. Patient has not had close follow up with a doctor in years, and has had no colonoscopies in the past. Does not take any medications. Patient is also complaining of constipation, and mentions decreased appetite but no significant weight loss. Also endorses two day history of voice hoarseness. Patient with no significant family history, mother lived to 101 and father lived to 98.     Patient's friend at bedside also mentioning that patient has been having gait disturbances; mentioning what appeared to be shuffling gait. Patient had difficulty getting up from bed so could not demonstrate. Also mentioned a three month history of left hand tremor, but is not present at time of interview.     In the ED, patient's BP was low at 94/61, and got 1 Liter of fluids, with BP readings going up to 119/70 after fluid resuscitation Otherwise patient's vitals were stable and was admitted to the floor in stable condition.  (03 Nov 2024 14:30)    Hospital Course:    Patient is an 79 year old male with no significant past medical history, presenting to the ED for history of abdominal pain, weakness, and orthopnea. Patient received 1 L NS bolus in the ED with improvement in blood pressure as noted in ED course above. Additionally, a CT head with no contrast was done in the ED to evaluate for potential brain etiologies contributing to patient's weakness, which showed no acute intracranial hemorrhage, mass effect, or midline shift, only demonstrating periventricular and subcortical white matter hypodensities consistent with microvascular type changes. 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 A CXR was also done, which only demonstrated bibasilar atelectasis. Patient was admitted to the floors in stable condition, and started on 50cc/hr LR for 20 hours for continued hydration. GI was consulted after CT demonst    1.8 x 2.3 x 1.6 cm suspected periampullary mass with moderate intra and   extrahepatic biliary ductal dilatation. No pancreatic ductal dilatation.    4.1 x 4.8 cm simple appearing fluid collection or cyst possibly arising   from either the left hepatic lobe or wall of the stomach. Causing mild   mass effect on the stomach.    Endoscopic ultrasound should be considered given the location         Labs and imaging were reflective of pancreatic mass with biliary obstruction, concerning for pancreatic malignancy, complicated by orthopnea, now admitted for further work up with GI following for biopsy via EUS/ERCP.           The patient is afebrile, hemodynamically stable and medically optimized for discharge to ___ with follow up with ____. On day of discharge, patient is clinically stable with no new exam findings or acute symptoms compared to prior. The patient was seen by the attending physician on the date of discharge and deemed stable and acceptable for discharge. The patient's chronic medical conditions were treated accordingly per the patient's home medication regimen. The patient's medication reconciliation (with changes made to chronic medications), follow up appointments, discharge orders, instructions, and significant lab and diagnostic studies are as noted.    Important Medication Changes and Reason:    Active or Pending Issues Requiring Follow-up:    Advanced Directives:   [ ] Full code  [ ] DNR  [ ] Hospice    Discharge Diagnoses:         HPI:  Patient is an 79 year old male with no significant past medial history presenting to the ED with a week long history of vague abdominal pain that travels throughout his abdomen that he describes as "cutting across his abdomen", and weakness that has been bothering him for the past week. He has been struggling to get out of bed for the past week limited by his weakness. Patient has not had close follow up with a doctor in years, and has had no colonoscopies in the past. Does not take any medications. Patient is also complaining of constipation, and mentions decreased appetite but no significant weight loss. Also endorses two day history of voice hoarseness. Patient with no significant family history, mother lived to 101 and father lived to 98.     Patient's friend at bedside also mentioning that patient has been having gait disturbances; mentioning what appeared to be shuffling gait. Patient had difficulty getting up from bed so could not demonstrate. Also mentioned a three month history of left hand tremor, but is not present at time of interview.     In the ED, patient's BP was low at 94/61, and got 1 Liter of fluids, with BP readings going up to 119/70 after fluid resuscitation Otherwise patient's vitals were stable and was admitted to the floor in stable condition.  (03 Nov 2024 14:30)    Hospital Course:    Patient is an 79 year old male with no significant past medical history, presenting to the ED for history of abdominal pain, weakness, and orthopnea. Patient received 1 L NS bolus in the ED with improvement in blood pressure as noted in ED course above. Additionally, a CT head with no contrast was done in the ED to evaluate for potential brain etiologies contributing to patient's weakness, which showed no acute intracranial hemorrhage, mass effect, or midline shift, only demonstrating periventricular and subcortical white matter hypodensities consistent with microvascular type changes. 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 A CXR was also done, which only demonstrated bibasilar atelectasis. Patient was admitted to the floors in stable condition, and started on 50cc/hr LR for 20 hours for continued hydration. GI was consulted due to concern for obstructing pancreatic mass from CTAP and direct hyperbilirubinemia, and recommended further exploration of the biliary tree via MRCP, ERCP.    1.8 x 2.3 x 1.6 cm suspected periampullary mass with moderate intra and   extrahepatic biliary ductal dilatation. No pancreatic ductal dilatation.    4.1 x 4.8 cm simple appearing fluid collection or cyst possibly arising   from either the left hepatic lobe or wall of the stomach. Causing mild   mass effect on the stomach.    Endoscopic ultrasound should be considered given the location         Labs and imaging were reflective of pancreatic mass with biliary obstruction, concerning for pancreatic malignancy, complicated by orthopnea, now admitted for further work up with GI following for biopsy via EUS/ERCP.           The patient is afebrile, hemodynamically stable and medically optimized for discharge to ___ with follow up with ____. On day of discharge, patient is clinically stable with no new exam findings or acute symptoms compared to prior. The patient was seen by the attending physician on the date of discharge and deemed stable and acceptable for discharge. The patient's chronic medical conditions were treated accordingly per the patient's home medication regimen. The patient's medication reconciliation (with changes made to chronic medications), follow up appointments, discharge orders, instructions, and significant lab and diagnostic studies are as noted.    Important Medication Changes and Reason:    Active or Pending Issues Requiring Follow-up:    Advanced Directives:   [ ] Full code  [ ] DNR  [ ] Hospice    Discharge Diagnoses:         HPI:  Patient is an 79 year old male with no significant past medial history presenting to the ED with a week long history of vague abdominal pain that travels throughout his abdomen that he describes as "cutting across his abdomen", and weakness that has been bothering him for the past week. He has been struggling to get out of bed for the past week limited by his weakness. Patient has not had close follow up with a doctor in years, and has had no colonoscopies in the past. Does not take any medications. Patient is also complaining of constipation, and mentions decreased appetite but no significant weight loss. Also endorses two day history of voice hoarseness. Patient with no significant family history, mother lived to 101 and father lived to 98.     Patient's friend at bedside also mentioning that patient has been having gait disturbances; mentioning what appeared to be shuffling gait. Patient had difficulty getting up from bed so could not demonstrate. Also mentioned a three month history of left hand tremor, but is not present at time of interview.     In the ED, patient's BP was low at 94/61, and got 1 Liter of fluids, with BP readings going up to 119/70 after fluid resuscitation Otherwise patient's vitals were stable and was admitted to the floor in stable condition.  (03 Nov 2024 14:30)    Hospital Course:    Patient is an 79 year old male with no significant past medical history, presenting to the ED on 11/03/24 for history of abdominal pain, weakness, and orthopnea. Patient received 1 L NS bolus in the ED with improvement in blood pressure as noted in ED course above. Additionally, a CT head with no contrast was done in the ED to evaluate for potential brain etiologies contributing to patient's weakness, which showed no acute intracranial hemorrhage, mass effect, or midline shift, only demonstrating periventricular and subcortical white matter hypodensities consistent with microvascular type changes. Additionally, a CT of the Abdomen and Pelvis with IV Contrast was done, which demonstrated a periampullary pancreatic mass with biliary duct obstruction, and a fluid collection causing a mass effect on the stomach. A CXR was also done, which only demonstrated bibasilar atelectasis. Patient was admitted to the floors in stable condition, and started on 50cc/hr LR for 20 hours for continued hydration. GI was consulted due to concern for obstructing pancreatic mass from CTAP and direct hyperbilirubinemia, and recommended further exploration of the biliary tree via MRCP, ERCP and EUS, which were done on 11/05, revealing cholangitis with large impacted ampullary stone, and multiple other smaller biliary stones. A successful creation of fistulotomy from duodenum to bile duct and extraction of the biliary stones, and purulent material was accomplished during the ERCP. The EUS redemonstrated anechoic lesions suggestive of multiple cysts in the left lobe of the liver in at least 3 segments, with the largest lesion measuring 45 mm by 40 mm in maximal cross-sectional diameter, causing extrinsic compression on the gastric body, potentially contributing to patient's lack of appetite, poor PO intake and weakness. Post procedure GI recommendations were appreciated, and patient was started on oral levofloxacin for treatment of acute cholangitis secondary to choledocholithiasis on 11/06. CBC and CMP were trended throughout the hospital course revealing stable H/H, ----- WBC count, and downtrending total bilirubin. The potential for post ERCP complications were closely monitored revealing no concern for bleeding, perforation, or pancreatitis. General surgery was consulted for evaluation for cholecystectomy during this admission, with their recommendations to defer cholecystectomy due to cholangitic inflammation, with outpatient follow up two weeks after discharge, and prescription for ursodiol.  Patient's orthopnea was further explored during hospital stay with a CTPA and TTE, which revealed no PE, EF of 55%, and grade 1 LV diastolic dysfunction.      The patient is afebrile, hemodynamically stable and medically optimized for discharge to home with follow up with PCP and Dr. Payton Ramirez from general surgery. On day of discharge, patient is clinically stable with no new exam findings or acute symptoms compared to prior. The patient was seen by the attending physician on the date of discharge and deemed stable and acceptable for discharge. The patient's chronic medical conditions were treated accordingly per the patient's home medication regimen. The patient's medication reconciliation (with changes made to chronic medications), follow up appointments, discharge orders, instructions, and significant lab and diagnostic studies are as noted.    Important Medication Changes and Reason:  levofloxacin for treatment of acute cholangitis   ursodiol for prevention of gallstone formation     Active or Pending Issues Requiring Follow-up:    Advanced Directives:   [ ] Full code  [ ] DNR  [ ] Hospice    Discharge Diagnoses:  Acute cholangitis secondary to choledocholithiasis        HPI:  Patient is an 79 year old male with no significant past medial history presenting to the ED with a week long history of vague abdominal pain that travels throughout his abdomen that he describes as "cutting across his abdomen", and weakness that has been bothering him for the past week. He has been struggling to get out of bed for the past week limited by his weakness. Patient has not had close follow up with a doctor in years, and has had no colonoscopies in the past. Does not take any medications. Patient is also complaining of constipation, and mentions decreased appetite but no significant weight loss. Also endorses two day history of voice hoarseness. Patient with no significant family history, mother lived to 101 and father lived to 98.     Patient's friend at bedside also mentioning that patient has been having gait disturbances; mentioning what appeared to be shuffling gait. Patient had difficulty getting up from bed so could not demonstrate. Also mentioned a three month history of left hand tremor, but is not present at time of interview.     In the ED, patient's BP was low at 94/61, and got 1 Liter of fluids, with BP readings going up to 119/70 after fluid resuscitation Otherwise patient's vitals were stable and was admitted to the floor in stable condition.  (03 Nov 2024 14:30)    Hospital Course:    Patient is an 79 year old male with no significant past medical history, presenting to the ED on 11/03/24 for history of abdominal pain, weakness, and orthopnea. Patient received 1 L NS bolus in the ED with improvement in blood pressure as noted in ED course above. Additionally, a CT head with no contrast was done in the ED to evaluate for potential brain etiologies contributing to patient's weakness, which showed no acute intracranial hemorrhage, mass effect, or midline shift, only demonstrating periventricular and subcortical white matter hypodensities consistent with microvascular type changes. Additionally, a CT of the Abdomen and Pelvis with IV Contrast was done, which demonstrated a periampullary pancreatic mass with biliary duct obstruction, and a fluid collection causing a mass effect on the stomach. A CXR was also done, which only demonstrated bibasilar atelectasis. Patient was admitted to the floors in stable condition, and started on 50cc/hr LR for 20 hours for continued hydration. GI was consulted due to concern for obstructing pancreatic mass from CTAP and direct hyperbilirubinemia, and recommended further exploration of the biliary tree via MRCP, ERCP and EUS, which were done on 11/05, revealing cholangitis with large impacted ampullary stone, and multiple other smaller biliary stones. A successful creation of fistulotomy from duodenum to bile duct and extraction of the biliary stones, and purulent material was accomplished during the ERCP. The EUS redemonstrated anechoic lesions suggestive of multiple cysts in the left lobe of the liver in at least 3 segments, with the largest lesion measuring 45 mm by 40 mm in maximal cross-sectional diameter, causing extrinsic compression on the gastric body, potentially contributing to patient's lack of appetite, poor PO intake and weakness. Post procedure GI recommendations were appreciated, and patient was started on oral levofloxacin for treatment of acute cholangitis secondary to choledocholithiasis on 11/06. CBC and CMP were trended throughout the hospital course revealing stable H/H, stable WBC count, and downtrending total bilirubin. The potential for post ERCP complications were closely monitored revealing no concern for bleeding, perforation, or pancreatitis. General surgery was consulted for evaluation for cholecystectomy during this admission, with their recommendations to defer cholecystectomy due to cholangitic inflammation, with outpatient follow up two weeks after discharge, and prescription for ursodiol.  Patient's orthopnea was further explored during hospital stay with a CTPA and TTE, which revealed no PE, EF of 55%, and grade 1 LV diastolic dysfunction.      The patient is afebrile, hemodynamically stable and medically optimized for discharge to home with follow up with PCP and Dr. Payton Ramirez from general surgery. On day of discharge, patient is clinically stable with no new exam findings or acute symptoms compared to prior. The patient was seen by the attending physician on the date of discharge and deemed stable and acceptable for discharge. The patient's chronic medical conditions were treated accordingly per the patient's home medication regimen. The patient's medication reconciliation (with changes made to chronic medications), follow up appointments, discharge orders, instructions, and significant lab and diagnostic studies are as noted.    Important Medication Changes and Reason:  levofloxacin for treatment of acute cholangitis   ursodiol for prevention of gallstone formation     Active or Pending Issues Requiring Follow-up:    Advanced Directives:   [ ] Full code  [ ] DNR  [ ] Hospice    Discharge Diagnoses:  Acute cholangitis secondary to choledocholithiasis        HPI:  Patient is an 79 year old male with no significant past medial history presenting to the ED with a week long history of vague abdominal pain that travels throughout his abdomen that he describes as "cutting across his abdomen", and weakness that has been bothering him for the past week. He has been struggling to get out of bed for the past week limited by his weakness. Patient has not had close follow up with a doctor in years, and has had no colonoscopies in the past. Does not take any medications. Patient is also complaining of constipation, and mentions decreased appetite but no significant weight loss. Also endorses two day history of voice hoarseness. Patient with no significant family history, mother lived to 101 and father lived to 98.     Patient's friend at bedside also mentioning that patient has been having gait disturbances; mentioning what appeared to be shuffling gait. Patient had difficulty getting up from bed so could not demonstrate. Also mentioned a three month history of left hand tremor, but is not present at time of interview.     In the ED, patient's BP was low at 94/61, and got 1 Liter of fluids, with BP readings going up to 119/70 after fluid resuscitation Otherwise patient's vitals were stable and was admitted to the floor in stable condition.  (03 Nov 2024 14:30)    Hospital Course:    Patient is an 79 year old male with no significant past medical history, presenting to the ED on 11/03/24 for history of abdominal pain, weakness, and orthopnea. Patient received 1 L NS bolus in the ED with improvement in blood pressure as noted in ED course above. Additionally, a CT head with no contrast was done in the ED to evaluate for potential brain etiologies contributing to patient's weakness, which showed no acute intracranial hemorrhage, mass effect, or midline shift, only demonstrating periventricular and subcortical white matter hypodensities consistent with microvascular type changes. Additionally, a CT of the Abdomen and Pelvis with IV Contrast was done, which demonstrated a periampullary pancreatic mass with biliary duct obstruction, and a fluid collection causing a mass effect on the stomach. A CXR was also done, which only demonstrated bibasilar atelectasis. Patient was admitted to the floors in stable condition, and started on 50cc/hr LR for 20 hours for continued hydration. GI was consulted due to concern for obstructing pancreatic mass from CTAP and direct hyperbilirubinemia, and recommended further exploration of the biliary tree via MRCP, ERCP and EUS, which were done on 11/05, revealing cholangitis with large impacted ampullary stone, and multiple other smaller biliary stones. A successful creation of fistulotomy from duodenum to bile duct and extraction of the biliary stones, and purulent material was accomplished during the ERCP. The EUS redemonstrated anechoic lesions suggestive of multiple cysts in the left lobe of the liver in at least 3 segments, with the largest lesion measuring 45 mm by 40 mm in maximal cross-sectional diameter, causing extrinsic compression on the gastric body, potentially contributing to patient's lack of appetite, poor PO intake and weakness. Post procedure GI recommendations were appreciated, and patient was started on oral levofloxacin for treatment of acute cholangitis secondary to choledocholithiasis on 11/06. CBC and CMP were trended throughout the hospital course revealing stable H/H, stable WBC count, and downtrending total bilirubin. The potential for post ERCP complications were closely monitored revealing no concern for bleeding, perforation, or pancreatitis. General surgery was consulted for evaluation for cholecystectomy during this admission, with their recommendations to defer cholecystectomy due to cholangitic inflammation, with outpatient follow up two weeks after discharge, and prescription for ursodiol.  Patient's orthopnea was further explored during hospital stay with a CTPA and TTE, which revealed no PE, EF of 55%, and grade 1 LV diastolic dysfunction.      The patient is afebrile, hemodynamically stable and medically optimized for discharge to home with follow up with PCP and Dr. Payton Ramirez from general surgery. On day of discharge, patient is clinically stable with no new exam findings or acute symptoms compared to prior. The patient was seen by the attending physician on the date of discharge and deemed stable and acceptable for discharge. The patient's chronic medical conditions were treated accordingly per the patient's home medication regimen. The patient's medication reconciliation (with changes made to chronic medications), follow up appointments, discharge orders, instructions, and significant lab and diagnostic studies are as noted.    Important Medication Changes and Reason:  levofloxacin for treatment of acute cholangitis   ursodiol for prevention of gallstone formation     Active or Pending Issues Requiring Follow-up:  f/u surgery for cholecystectomy  f/u pcp/hepatology for liver cyst    Advanced Directives:   [x] Full code  [ ] DNR  [ ] Hospice    Discharge Diagnoses:  Acute cholangitis secondary to choledocholithiasis

## 2024-11-05 NOTE — DISCHARGE NOTE PROVIDER - NSDCMRMEDTOKEN_GEN_ALL_CORE_FT
levoFLOXacin 750 mg oral tablet: 1 tab(s) orally every 24 hours  ursodiol 300 mg oral capsule: 1 cap(s) orally every 12 hours

## 2024-11-05 NOTE — ASU PREOP CHECKLIST - ORDERS/MEDICATION ADMINISTRATION RECORD ON CHART
Patient requests delay of start due to conflicts until 11/16.    Triage to call approval to [phone] (confidential VM).  Didi Lam RN     done

## 2024-11-06 DIAGNOSIS — K83.09 OTHER CHOLANGITIS: ICD-10-CM

## 2024-11-06 LAB
ALBUMIN SERPL ELPH-MCNC: 3 G/DL — LOW (ref 3.3–5)
ALP SERPL-CCNC: 247 U/L — HIGH (ref 40–120)
ALT FLD-CCNC: 78 U/L — HIGH (ref 4–41)
ANION GAP SERPL CALC-SCNC: 12 MMOL/L — SIGNIFICANT CHANGE UP (ref 7–14)
AST SERPL-CCNC: 33 U/L — SIGNIFICANT CHANGE UP (ref 4–40)
BILIRUB SERPL-MCNC: 2.1 MG/DL — HIGH (ref 0.2–1.2)
BUN SERPL-MCNC: 22 MG/DL — SIGNIFICANT CHANGE UP (ref 7–23)
CALCIUM SERPL-MCNC: 8.3 MG/DL — LOW (ref 8.4–10.5)
CHLORIDE SERPL-SCNC: 104 MMOL/L — SIGNIFICANT CHANGE UP (ref 98–107)
CO2 SERPL-SCNC: 24 MMOL/L — SIGNIFICANT CHANGE UP (ref 22–31)
CREAT SERPL-MCNC: 0.79 MG/DL — SIGNIFICANT CHANGE UP (ref 0.5–1.3)
EGFR: 89 ML/MIN/1.73M2 — SIGNIFICANT CHANGE UP
GLUCOSE SERPL-MCNC: 126 MG/DL — HIGH (ref 70–99)
HCT VFR BLD CALC: 37.9 % — LOW (ref 39–50)
HGB BLD-MCNC: 12.3 G/DL — LOW (ref 13–17)
MAGNESIUM SERPL-MCNC: 2.6 MG/DL — SIGNIFICANT CHANGE UP (ref 1.6–2.6)
MCHC RBC-ENTMCNC: 28.3 PG — SIGNIFICANT CHANGE UP (ref 27–34)
MCHC RBC-ENTMCNC: 32.5 G/DL — SIGNIFICANT CHANGE UP (ref 32–36)
MCV RBC AUTO: 87.1 FL — SIGNIFICANT CHANGE UP (ref 80–100)
NRBC # BLD: 0 /100 WBCS — SIGNIFICANT CHANGE UP (ref 0–0)
NRBC # FLD: 0 K/UL — SIGNIFICANT CHANGE UP (ref 0–0)
PHOSPHATE SERPL-MCNC: 4.1 MG/DL — SIGNIFICANT CHANGE UP (ref 2.5–4.5)
PLATELET # BLD AUTO: 207 K/UL — SIGNIFICANT CHANGE UP (ref 150–400)
POTASSIUM SERPL-MCNC: 4.7 MMOL/L — SIGNIFICANT CHANGE UP (ref 3.5–5.3)
POTASSIUM SERPL-SCNC: 4.7 MMOL/L — SIGNIFICANT CHANGE UP (ref 3.5–5.3)
PROT SERPL-MCNC: 6.6 G/DL — SIGNIFICANT CHANGE UP (ref 6–8.3)
RBC # BLD: 4.35 M/UL — SIGNIFICANT CHANGE UP (ref 4.2–5.8)
RBC # FLD: 14.7 % — HIGH (ref 10.3–14.5)
SODIUM SERPL-SCNC: 140 MMOL/L — SIGNIFICANT CHANGE UP (ref 135–145)
WBC # BLD: 14.18 K/UL — HIGH (ref 3.8–10.5)
WBC # FLD AUTO: 14.18 K/UL — HIGH (ref 3.8–10.5)

## 2024-11-06 PROCEDURE — 99232 SBSQ HOSP IP/OBS MODERATE 35: CPT | Mod: GC

## 2024-11-06 PROCEDURE — 99222 1ST HOSP IP/OBS MODERATE 55: CPT | Mod: 57,GC

## 2024-11-06 PROCEDURE — 99233 SBSQ HOSP IP/OBS HIGH 50: CPT | Mod: GC

## 2024-11-06 RX ADMIN — POLYETHYLENE GLYCOL 3350 17 GRAM(S): 17 POWDER, FOR SOLUTION ORAL at 17:04

## 2024-11-06 RX ADMIN — Medication 6 MILLIGRAM(S): at 22:18

## 2024-11-06 RX ADMIN — LIDOCAINE HYDROCHLORIDE 1 PATCH: 40 SOLUTION TOPICAL at 11:12

## 2024-11-06 RX ADMIN — LIDOCAINE HYDROCHLORIDE 1 PATCH: 40 SOLUTION TOPICAL at 18:03

## 2024-11-06 RX ADMIN — POLYETHYLENE GLYCOL 3350 17 GRAM(S): 17 POWDER, FOR SOLUTION ORAL at 05:48

## 2024-11-06 RX ADMIN — CHLORHEXIDINE GLUCONATE 1 APPLICATION(S): 40 SOLUTION TOPICAL at 11:14

## 2024-11-06 RX ADMIN — LIDOCAINE HYDROCHLORIDE 1 PATCH: 40 SOLUTION TOPICAL at 22:30

## 2024-11-06 NOTE — CONSULT NOTE ADULT - SUBJECTIVE AND OBJECTIVE BOX
Chief Complaint:  weakness    HPI:    Mr. Rodriguez is an 79 year old male with no significant past medial history presenting to the ED with a week long history of vague abdominal and weakness. He has been struggling to get out of bed due to his weakness. Patient has not had close follow up with a doctor in years and does not take any medications. Patient is also complaining of constipation, and mentions decreased appetite but no significant weight loss.  Patient's friend mentioned gait disturbances; mentioning what appeared to be shuffling gait. Also mentioned a three month history of left hand tremor, but is not present at time of interview.      Upon arrival, patient is found to have elevated bilirubin and liver enzymes along with CT findings of pancreatic mass and biliary obstruction for which GI is consulted. Upon interview, patient reports that he is unaware of CT findings as these have not yet been discussed with him.       Allergies:  No Known Allergies    Hospital Medications:  chlorhexidine 2% Cloths 1 Application(s) Topical daily  enoxaparin Injectable 40 milliGRAM(s) SubCutaneous every 24 hours  influenza  Vaccine (HIGH DOSE) 0.5 milliLiter(s) IntraMuscular once  lactated ringers. 1000 milliLiter(s) IV Continuous <Continuous>  lidocaine   4% Patch 1 Patch Transdermal daily  magnesium hydroxide Suspension 30 milliLiter(s) Oral daily PRN  melatonin 6 milliGRAM(s) Oral at bedtime  oxyCODONE    IR 2.5 milliGRAM(s) Oral every 6 hours PRN  oxyCODONE    IR 5 milliGRAM(s) Oral every 6 hours PRN      PMHX/PSHX:  No pertinent past medical history    History of arthroplasty of right knee    H/O eye surgery    Family history:  No pertinent family history (Father, Mother)    No pertinent family history in first degree relatives    Social History:   No alcohol or smoking    ROS:   See HPI    PHYSICAL EXAM:   GENERAL:  NAD, resting comfortably in bed  HEENT:  Sclera icteric  RESPIRATORY:  Normal effort  CARDIAC:  HDS  ABDOMEN:  Soft, non-tender, non-distended  EXTREMITIES:  No edema  SKIN:  Warm & Dry. No jaundice.   NEURO:  Alert, conversant, no focal deficit    Vital Signs:  Vital Signs Last 24 Hrs  T(C): 36.6 (2024 05:47), Max: 37 (2024 10:02)  T(F): 97.8 (2024 05:47), Max: 98.6 (2024 10:02)  HR: 70 (2024 05:47) (70 - 89)  BP: 121/70 (2024 05:47) (94/61 - 121/70)  BP(mean): --  RR: 18 (2024 05:47) (17 - 21)  SpO2: 100% (2024 05:47) (96% - 100%)    Parameters below as of 2024 05:47  Patient On (Oxygen Delivery Method): room air      Daily Height in cm: 177.8 (2024 16:13)    Daily     LABS:                        13.4   1198 )-----------( 133      ( 2024 11:02 )             38.9     Mean Cell Volume: 83.1 fL (24 @ 11:02)        139  |  100  |  26[H]  ----------------------------<  159[H]  3.5   |  24  |  1.05    Ca    8.9      2024 11:02  Mg     2.30         TPro  7.0  /  Alb  3.5  /  TBili  8.0[H]  /  DBili  6.5[H]  /  AST  122[H]  /  ALT  183[H]  /  AlkPhos  300[H]      LIVER FUNCTIONS - ( 2024 11:02 )  Alb: 3.5 g/dL / Pro: 7.0 g/dL / ALK PHOS: 300 U/L / ALT: 183 U/L / AST: 122 U/L / GGT: 371 U/L         Urinalysis Basic - ( 2024 15:00 )    Color: Dark Yellow / Appearance: Clear / S.086 / pH: x  Gluc: x / Ketone: Negative mg/dL  / Bili: Moderate / Urobili: 2.0 mg/dL   Blood: x / Protein: 30 mg/dL / Nitrite: Negative   Leuk Esterase: Negative / RBC: 3 /HPF / WBC 0 /HPF   Sq Epi: x / Non Sq Epi: 4 /HPF / Bacteria: Negative /HPF                              13.4   11.98 )-----------( 133      ( 2024 11:02 )             38.9     Imaging:    < from: CT Abdomen and Pelvis w/ IV Cont (24 @ 12:04) >  FINDINGS:  LOWER CHEST: Within normal limits.    LIVER: Multiple hepatic cysts measuring up to 3.3 cm in the left hepatic   lobe. Etiology pressure.  BILE DUCTS: Moderate intrahepatic biliary ductal dilatation with 2.0 cm   dilated CBD with abrupt cut off at a 1.8 x 2.3 x 1.6 cm periampullary   mass.  GALLBLADDER: Suspected small gallbladder without definitive gallstones.  SPLEEN: Within normal limits.  PANCREAS: 1.8 x 2.3 x 1.6 cm periampullary mass suspected series 301   image 58. No pancreatic ductal dilatation.  ADRENALS: Within normal limits.  KIDNEYS/URETERS: No renal stones or hydronephrosis. Left renal cyst.    BLADDER: Within normal limits.  REPRODUCTIVE ORGANS: Prostate within normal limits.    BOWEL: No bowel obstruction. Appendix is normal.  4.1 x 4.8 cm fluid collection or cyst adjacent to the left edge of the   liver and wall the stomach.  Large volume rectal stool without evidence of stercoral colitis.  PERITONEUM/RETROPERITONEUM: Within normal limits.  VESSELS: Within normal limits.  LYMPH NODES: No lymphadenopathy.  ABDOMINAL WALL: Tiny fat-containing right inguinal hernia. Nonspecific   small inguinal lymph nodes  BONES: Degenerative changes.    IMPRESSION:  1.8 x 2.3 x 1.6 cm suspected periampullary mass with moderate intra and   extrahepatic biliary ductal dilatation. No pancreatic ductal dilatation.    4.1 x 4.8 cm simple appearing fluid collection or cyst possibly arising   from either the left hepatic lobe or wall of the stomach. Causing mild   mass effect on the stomach.      < end of copied text >    
                                                                                            General Surgery Consult  Consulting surgical team: B   Consulting attending: James    HPI:  79M no medical or surgical history, pw vague abdominal pain, weakness, loss of appetite, and a shuffling gate. Patient was admitted to medicine and thought to have a pancreatic mass on CTAP, which on further examination w/ MRCP seemed more cw gallstones. Patient underwent EUS/ERCP on 11/5 for choledocholithiasis with sphincterotomy, stone extraction, and evacuation of pus. Patient has been on antibiotics for cholangitis, but has not had vital sign derangement. LFTs have been downtrending and WBC remains elevated at 14.    Surgery consulted to evaluate for cholecystectomy. Patient denies any abdominal pain at present and has no complaints. Of note, patient found to have several simple hepatic cysts as well as a cystic lesion measuring 5.3cm x 4.0cm between the left hepatic lobe and stomach.      PAST MEDICAL HISTORY:  No pertinent past medical history      PAST SURGICAL HISTORY:  History of arthroplasty of right knee    H/O eye surgery        MEDICATIONS:  chlorhexidine 2% Cloths 1 Application(s) Topical daily  influenza  Vaccine (HIGH DOSE) 0.5 milliLiter(s) IntraMuscular once  lactated ringers. 1000 milliLiter(s) IV Continuous <Continuous>  levoFLOXacin  Tablet 750 milliGRAM(s) Oral every 24 hours  lidocaine   4% Patch 1 Patch Transdermal daily  magnesium hydroxide Suspension 30 milliLiter(s) Oral daily PRN  melatonin 6 milliGRAM(s) Oral at bedtime  oxyCODONE    IR 5 milliGRAM(s) Oral every 6 hours PRN  oxyCODONE    IR 2.5 milliGRAM(s) Oral every 6 hours PRN  polyethylene glycol 3350 17 Gram(s) Oral two times a day  senna 2 Tablet(s) Oral at bedtime      ALLERGIES:  No Known Allergies      VITALS & I/Os:  Vital Signs Last 24 Hrs  T(C): 36.5 (06 Nov 2024 06:00), Max: 36.7 (06 Nov 2024 02:00)  T(F): 97.7 (06 Nov 2024 06:00), Max: 98 (06 Nov 2024 02:00)  HR: 60 (06 Nov 2024 06:00) (60 - 80)  BP: 120/70 (06 Nov 2024 06:00) (115/68 - 129/73)  BP(mean): --  RR: 18 (06 Nov 2024 06:00) (18 - 20)  SpO2: 98% (06 Nov 2024 06:00) (96% - 99%)    Parameters below as of 06 Nov 2024 06:00  Patient On (Oxygen Delivery Method): room air        I&O's Summary    05 Nov 2024 07:01  -  06 Nov 2024 07:00  --------------------------------------------------------  IN: 220 mL / OUT: 650 mL / NET: -430 mL        PHYSICAL EXAM:  General: Not acutely distressed  Respiratory: Nonlabored respirations  Cardiovascular: Pulse present  Abdominal: Soft, distended, nontender. No rebound or guarding. No organomegaly, no palpable mass  Extremities: Warm    LABS:                        12.3   14.18 )-----------( 207      ( 06 Nov 2024 06:10 )             37.9     11-06    140  |  104  |  22  ----------------------------<  126[H]  4.7   |  24  |  0.79    Ca    8.3[L]      06 Nov 2024 06:10  Phos  4.1     11-06  Mg     2.60     11-06    TPro  6.6  /  Alb  3.0[L]  /  TBili  2.1[H]  /  DBili  x   /  AST  33  /  ALT  78[H]  /  AlkPhos  247[H]  11-06    Lactate:    PT/INR - ( 05 Nov 2024 06:30 )   PT: 12.6 sec;   INR: 1.06 ratio         PTT - ( 05 Nov 2024 06:30 )  PTT:30.6 sec          Urinalysis Basic - ( 06 Nov 2024 06:10 )    Color: x / Appearance: x / SG: x / pH: x  Gluc: 126 mg/dL / Ketone: x  / Bili: x / Urobili: x   Blood: x / Protein: x / Nitrite: x   Leuk Esterase: x / RBC: x / WBC x   Sq Epi: x / Non Sq Epi: x / Bacteria: x        IMAGING:  < from: CT Abdomen and Pelvis w/ IV Cont (11.03.24 @ 12:04) >  IMPRESSION:  1.8 x 2.3 x 1.6 cm suspected periampullary mass with moderate intra and   extrahepatic biliary ductal dilatation. No pancreatic ductal dilatation.    4.1 x 4.8 cm simple appearing fluid collection or cyst possibly arising   from either the left hepatic lobe or wall of the stomach. Causing mild   mass effect on the stomach.    Endoscopic ultrasound should be considered given the location    --- End of Report ---    < end of copied text >  < from: MR MRCP w/wo IV Cont (11.05.24 @ 09:38) >  LIVER: Normal hepatic morphology. Multiple hepatic simple cysts. A cystic   lesion between the left hepatic lobe and the stomach measuring 5.3 x 4.0   cm, likely of hepatic origin.  BILE DUCTS: Intra and extrahepatic biliary ductal dilatation. CBD   measuring up to 1.5 cm. Two T2 hypointense filling defects in the distal   CBD measuring 0.7 cm and 0.9 cm (6/31) without enhancement, suggestive of   choledocholithiasis. Circumferential mural thickening and enhancement of   the CBD suggestive of infection/inflammation.  GALLBLADDER: Either small gallbladder or remnant gallbladder with   intraluminal stone. No evidence of inflammation.  SPLEEN: Within normal limits.  PANCREAS: No pancreatic ductal dilatation.  ADRENALS: Within normal limits.  KIDNEYS/URETERS: Bilateral renal simple cysts. No hydronephrosis.    VISUALIZED PORTIONS:  BOWEL: Within normal limits.  PERITONEUM: No ascites.  VESSELS: Within normal limits.  RETROPERITONEUM/LYMPH NODES: No lymphadenopathy.  ABDOMINAL WALL: Within normal limits.  BONES: Degenerative changes.    IMPRESSION:  Findings are compatible with obstructive two choledocholithiasis with   intra and extrahepatic biliary ductal dilatation. Consider follow-up ERCP.    < end of copied text >

## 2024-11-06 NOTE — PROGRESS NOTE ADULT - PROBLEM SELECTOR PLAN 8
- Fluids: s/p LR @50 cc/hr for 20 hours  - Electrolytes: Will replete to maintain K>4, Phos>3, and Mag>2  - Nutrition: Regular diet   - DVT Prophylaxis: Lovenox   - Disposition: Pending PT eval - Fluids: s/p LR @50 cc/hr for 20 hours  - Electrolytes: Will replete to maintain K>4, Phos>3, and Mag>2  - Nutrition: Regular diet   - DVT Prophylaxis: Lovenox (hold for 48 hrs s/p ERCP)   - Disposition: Pending As above - Fluids: s/p LR @50 cc/hr for 20 hours  - Electrolytes: Will replete to maintain K>4, Phos>3, and Mag>2  - Nutrition: Regular diet   - DVT Prophylaxis: Lovenox (hold for 48 hrs s/p ERCP)   - Disposition: Pending surgery eval

## 2024-11-06 NOTE — CONSULT NOTE ADULT - ATTENDING COMMENTS
symptomatic cholelithiasis s/p ERCP extraction of choledocholithiasis  medically optimized for lap ccy  patient drank ensure this afternoon  npo past midnight for lap ccy tmrw  hand off given to B team surgeon-of-week now s/p ERCP extraction of choledocholithiasis  CT scan and MRCP both reviewed; his gallbladder is very small    suggest RUQ US to assess for residual stones before planning lap ccy  a nondistended GB and dilated CBD may be challenging anatomy for surgical extirpation, at least until the CBD shrinks after relief of obstruction    B team will follow now s/p ERCP extraction of choledocholithiasis  CT scan and MRCP both reviewed; his gallbladder is very small    suggest allowing main ducts to shrink down and inflammation to recede before planning lap ccy  a nondistended GB and dilated CBD may be challenging anatomy for surgical extirpation, at least until the CBD shrinks after relief of obstruction    patient may be started on actigall and f/u with us as outpatient        [facility]:    urgent issues - pager [phone]  nonurgent issues - [phone]  patient appointments or afterhours - [phone]

## 2024-11-06 NOTE — PROGRESS NOTE ADULT - PROBLEM SELECTOR PLAN 4
Total bilirubin elevated to 8 on admission, cholestatic picture including elevations in GGT, Alk Phos, and LFT's. 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     - Continue to trend: downtrending   - see pancreatic mass, likely iso of choledocho Total bilirubin elevated to 8 on admission, cholestatic picture including elevations in GGT, Alk Phos, and LFT's. 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     - Continue to trend: downtrending   - see pancreatic mass, likely iso of choledocho --> cholangitis Patient with history of not being to sleep flat, and new voice hoarseness, with no lower extremity edema. Unlikely to be CHF, however cannot rule out at this point. Pt describes SOB when laying on abdomen and not flat on back over the last three days, less concern for acute cardio/pulm etiology. Rule out PE iso elevated D-dimer, and potential malignancy. Can be due to abdominal distension and positioning.     s/p CTPA (11/4): no PE or masses visualized, elevated D-dimer    - F/u TTE results: EF 55% and grade 1 diastolic dysfunction.   - PT eval: outpatient PT   - CXR: bibasilar atelactasis Total bilirubin elevated to 8 on admission, cholestatic picture including elevations in GGT, Alk Phos, and LFT's. 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     - Continue to trend: downtrending   - see cholangitis mass, likely iso of cholangitis 2/2 choledocho

## 2024-11-06 NOTE — PROGRESS NOTE ADULT - ASSESSMENT
Patient is an 79 year old male with no significant past medical history, presenting to the ED for history of abdominal pain and weakness. Labs and CT A/P were initially reflective of pancreatic mass with biliary obstruction, concerning for pancreatic malignancy, complicated by orthopnea, with MRCP and daily downtrending bilirubin now with less concern for pancreatic mass and more concern for choledocholithiasis  now admitted for further work up with GI following for potential EUS/ERCP.  Patient is an 79 year old male with no significant past medical history, presenting to the ED for history of abdominal pain and weakness. Labs and CT A/P were initially reflective of pancreatic mass with biliary obstruction, concerning for pancreatic malignancy, complicated by orthopnea, s/p ERCP with GI on 11/5 with extraction of multiple stones from bile duct and no concern for mass, now admitted for treatment of cholangitis      Patient is an 79 year old male with no significant past medical history, presenting to the ED for history of abdominal pain and weakness. Labs and CT A/P were initially reflective of pancreatic mass with biliary obstruction, concerning for pancreatic malignancy, complicated by orthopnea, s/p ERCP with GI on 11/5 with extraction of multiple stones from bile duct and no concern for mass, now admitted for treatment of acute cholangitis secondary to choledocholithiasis.       Patient is an 79 year old male with no significant past medical history, presenting to the ED for history of abdominal pain and weakness. Labs and CT A/P were initially reflective of pancreatic mass with biliary obstruction, concerning for pancreatic malignancy, complicated by orthopnea, s/p ERCP with GI on 11/5 with extraction of multiple stones from bile duct and no concern for mass, now admitted for treatment of acute cholangitis secondary to choledocholithiasis and evaluation for cholecystectomy

## 2024-11-06 NOTE — PROGRESS NOTE ADULT - PROBLEM SELECTOR PLAN 9
- Fluids: s/p LR @50 cc/hr for 20 hours  - Electrolytes: Will replete to maintain K>4, Phos>3, and Mag>2  - Nutrition: Regular diet   - DVT Prophylaxis: Lovenox (hold for 48 hrs s/p ERCP)   - Disposition: Pending

## 2024-11-06 NOTE — PROGRESS NOTE ADULT - ASSESSMENT
Mr. Rodriguez is an 79 year old male with no known PMH who presented with abdominal pain, weakness and unsteady gait. He is admitted with jaundice, abnormal liver chemistry and suspected periampullary mass with biliary obstruction.    #Pancreatic mass  #Biliary obstruction  #Jaundice  *EUS 11/5/2024 - No ampullary mass seen on EGD, side viewing duodenoscope, or on EUS. Large impacted stone in the bile duct / ampulla, causing bulging of the ampulla. Multiple cystic structures seen in the left liver lobe.  *ERCP 11/5/2024 - Cholangitis with large impacted ampullary stone, and multiple other smaller biliary stones. Successful creation of fistulotomy from duodenum to bile duct and extraction of the biliary stones, and purulent material. All stones were removed from the bile duct. Enterosocpy performed to capture the large extracted stone. The stone was fragmented during enteroscopy (to avoid biliary ileus) therefore was not removed.    Recommendations:  - trend CBC, CMP  - diet as tolerated.  - hold anticoagulation for 48 hours from procedure  - Start antibiotics for cholangitis. Can consider Levofloxacin.  - Monitor for bleeding, pancreatitis, and perforation.  - Surgical evaluation for cholecystectomy. Timing of cholecystectomy as per surgical team.      All recommendations preliminary until note signed by service attending.    Thank you for involving us in the care of this patient. Please contact should any concern or questions arise.    Kane Hall MD   Gastroenterology/Hepatology Fellow PGY-5  Available on [employer] Teams 7am - 5pm  [phone]     Mr. Rodriguez is an 79 year old male with no known PMH who presented with abdominal pain, weakness and unsteady gait. He is admitted with jaundice, abnormal liver chemistry and suspected periampullary mass with biliary obstruction.    #Biliary obstruction  #Jaundice  #Choledocholithiasis impaction  #Cholangitis    *EUS 11/5/2024 - No ampullary mass seen on EGD, side viewing duodenoscope, or on EUS. Large impacted stone in the bile duct / ampulla, causing bulging of the ampulla. Multiple cystic structures seen in the left liver lobe.  *ERCP 11/5/2024 - Cholangitis with large impacted ampullary stone, and multiple other smaller biliary stones. Successful creation of fistulotomy from duodenum to bile duct and extraction of the biliary stones, and purulent material. All stones were removed from the bile duct. Enterosocpy performed to capture the large extracted stone. The stone was fragmented during enteroscopy (to avoid biliary ileus) therefore was not removed.    Recommendations:  - trend CBC, CMP  - diet as tolerated.  - hold anticoagulation for 48 hours from procedure  - Start antibiotics for cholangitis. Can consider Levofloxacin.  - Monitor for bleeding, pancreatitis, and perforation.  - Surgical evaluation for cholecystectomy. Timing of cholecystectomy as per surgical team.      All recommendations preliminary until note signed by service attending.    Thank you for involving us in the care of this patient. Please contact should any concern or questions arise.    Kane Hall MD   Gastroenterology/Hepatology Fellow PGY-5  Available on [employer] Teams 7am - 5pm  [phone]

## 2024-11-06 NOTE — PROGRESS NOTE ADULT - PROBLEM SELECTOR PLAN 2
Patient with week-long history of migratory abdominal pain, likely related to mass found on imaging. Constipation can be another contributing factor as abdomen is distended on exam, CT abdomen revealed stool burden, and patient reports he is constipated. Low concern for peritonitis at this time, serial abdominal exams deferred. MRCP showing concern for choledocholithiasis.    - oxy 2.5 q6 PRN for mild and oxy 5q6 for moderate/severe pain   - F/u GI recs: see pancreatic mass  - Trend LFT's, bilirubin: downtrending --> more consistent with choledocholithiasis than permanent obstruction in the form of a mass  - Bowel regimen for constipation Periampullary mass found on CTAP, with intrahepatic bile duct dilation. Elevated alk phos, T. bili, and GGT concerning for cholestatic picture. MRCP with concern for obstructive two choledocholithiasis with intra and extrahepatic biliary ductal dilatation, and in the context of daily downtrending bili, may make pancreatic mass seen on initial CTAP less likely to represent malignant mass but rather two obstructing stones together.   s/p ERCP with GI on 11/5 with no concern for mass and extraction of stones performed consistent with cholangitis     - GI recs appreciated: diet as tolerated, consider levofloxacin for treatment of cholangitis, monitor for bleeding, pancreatitis, and perforation, surgical eval for cholecystectomy   - f/u: CEA WNL, CA 19-9: 1506 Patient with week-long history of migratory abdominal pain, likely related to mass found on imaging. Constipation can be another contributing factor as abdomen is distended on exam, CT abdomen revealed stool burden, and patient reports he is constipated. Low concern for peritonitis at this time, serial abdominal exams deferred. MRCP showing concern for choledocholithiasis. s/p ERCP with stone extraction 11/6    - oxy 2.5 q6 PRN for mild and oxy 5q6 for moderate/severe pain   - F/u GI recs: see cholangitis   - Trend LFT's, bilirubin: downtrending --> more consistent with choledocholithiasis than permanent obstruction in the form of a mass  - Bowel regimen for constipation

## 2024-11-06 NOTE — PROGRESS NOTE ADULT - SUBJECTIVE AND OBJECTIVE BOX
Interval Events:   -NAEON  -s/p EUS/ERCP 1/05, details below    Hospital Medications:  chlorhexidine 2% Cloths 1 Application(s) Topical daily  enoxaparin Injectable 40 milliGRAM(s) SubCutaneous every 24 hours  influenza  Vaccine (HIGH DOSE) 0.5 milliLiter(s) IntraMuscular once  lactated ringers. 1000 milliLiter(s) IV Continuous <Continuous>  lidocaine   4% Patch 1 Patch Transdermal daily  magnesium hydroxide Suspension 30 milliLiter(s) Oral daily PRN  melatonin 6 milliGRAM(s) Oral at bedtime  oxyCODONE    IR 2.5 milliGRAM(s) Oral every 6 hours PRN  oxyCODONE    IR 5 milliGRAM(s) Oral every 6 hours PRN  polyethylene glycol 3350 17 Gram(s) Oral two times a day  senna 2 Tablet(s) Oral at bedtime      PHYSICAL EXAM:   Vital Signs:  Vital Signs Last 24 Hrs  T(C): 36.5 (06 Nov 2024 06:00), Max: 37 (05 Nov 2024 13:07)  T(F): 97.7 (06 Nov 2024 06:00), Max: 98.6 (05 Nov 2024 13:07)  HR: 60 (06 Nov 2024 06:00) (60 - 80)  BP: 120/70 (06 Nov 2024 06:00) (110/69 - 129/73)  BP(mean): --  RR: 18 (06 Nov 2024 06:00) (18 - 22)  SpO2: 98% (06 Nov 2024 06:00) (95% - 99%)    Parameters below as of 06 Nov 2024 06:00  Patient On (Oxygen Delivery Method): room air      Daily Height in cm: 177.8 (05 Nov 2024 13:25)    Daily     GENERAL: no acute distress  NEURO: alert  HEENT: NCAT, no conjunctival pallor appreciated  CHEST: no respiratory distress, no accessory muscle use  CARDIAC: regular rate, +S1/S2  ABDOMEN: soft, nontender, no rebound or guarding  EXTREMITIES: warm, well perfused  SKIN: no lesions noted                          12.3   14.18 )-----------( 207      ( 06 Nov 2024 06:10 )             37.9     11-05    140  |  103  |  18  ----------------------------<  111[H]  3.8   |  25  |  0.76    Ca    8.3[L]      05 Nov 2024 06:30  Phos  3.1     11-05  Mg     2.50     11-05    TPro  6.5  /  Alb  3.0[L]  /  TBili  2.7[H]  /  DBili  x   /  AST  40  /  ALT  100[H]  /  AlkPhos  264[H]  11-05    LIVER FUNCTIONS - ( 05 Nov 2024 06:30 )  Alb: 3.0 g/dL / Pro: 6.5 g/dL / ALK PHOS: 264 U/L / ALT: 100 U/L / AST: 40 U/L / GGT: x             Interval Diagnostic Studies:  < from: Upper EUS (11.05.24 @ 13:27) >  Findings:       ENDOSCOPIC FINDING: :       The examined esophagus was endoscopically normal.       The entire examined stomach was normal. There was extrinsic compression        on the lesser curvature of the stomach. This was later found to be from        liver cyst.       The examined duodenum was endoscopically normal. Ampulla mucosa appeared        normal but was bulging indicated an impacted stone.       ENDOSONOGRAPHIC FINDING: :       There was no sign of significant endosonographic abnormality in the        ampulla. No pathologic lymphadenopathy and no masses were identified.       There was dilation in the common bile duct and diffusely throughout the        intrahepatic bile duct(s) which measured up to 13 mm.       Two stones were visualized endosonographically in the lower third of the        main bile duct. The stones measured up to 10-15 mm in greatest        dimension. The stones were round. They were hyperechoic and        characterized by shadowing. The larger stone       There was no sign of significant endosonographic abnormality in the        pancreatic head, genu of the pancreas, pancreatic body, pancreatic tail,        uncinate process of the pancreas and main pancreatic duct. Examination        of pancreatic head was limited from the large shadowing created from        large biliary stone. The pancreatic duct measured up to 3 mm in        diameter. No masses.  Anechoic lesions suggestive of multiple cysts were identified in the        left lobe of the liver in at least 3 segments. The largest lesion        measured 45 mm by 40 mm in maximal cross-sectional diameter. This was        causing extrinsic compression on the gastric body.       No lymphadenopathy seen.       There was no sign of significant endosonographic abnormality involving        the celiac trunk.                                                                                   Impression:          - No ampullary mass seen on EGD, side viewing                        duodenoscope, or on EUS.                       - Large impacted stone in the bile duct / ampulla,                        causing bulging of the ampulla.      - Multiple cystic structures seen in the left liver lobe.                       - Due to technical difficulties no images were saved                        from EGD/EUS.  Recommendation:      - Will proceed with ERCP with removal of the stone. Plan                        as per ERCP report.                                                                                   Attending Participation:       I personally performed the entire procedure.                                      ______________  Dillon Delvalle,   11/5/2024 4:03:05 PM    < end of copied text >  < from: ERCP (11.05.24 @ 13:37) >  Findings:       The  film was normal. The esophagus was successfully intubated        under direct vision. The scope was advanced to a normal major papilla in        the descending duodenum without detailed examination of the pharynx,        larynx and associated structures, and upper GI tract. The upper GI tract        was grossly normal. The major papilla was bulging. The ventral        pancreatic duct inadvertently cannulated with the short-nosed traction        sphincterotome. Contrast was injected. I personally interpreted the bile        duct images. There was brisk flow of contrast through the ducts. Image        quality was adequate. Contrast extended to the proximal pancreatic duct.        The guide wire and tome were removed. Decision was made to create a        fistulotomy. A 15mm pre-cut sphincterotomy was made with a needle knife        using a freehand technique using ERBE electrocautery over the bulging        area between the major and minor ampulla. There was no        post-sphincterotomy bleeding. Stone could be seen impacted at the        fistulotomy site. The biliary tree was swept with a 15 mm balloon        starting at the left intrahepatic duct(s) and right intrahepatic        duct(s). A large impacted stone measuring 15 mm was removed from the        bile duct. Pus was swept from the duct. Sludge was swept from the duct.        Many other smaller stones were removed. No stones remained. Occlusion        cholangiogram confirmed no further filling defect(s). Duodenoscope was    switched with pediatric colonoscope to capture the larger biliary stone        and to prevent gallstone ileus. Enteroscope was advanced to D4 area. The        stone was fragmented by passage of the enteroscope. Since the smaller        fragmented stone was seen decision was made to not capture the stone at        this time.                                                                                   Impression:          - Cholangitis with large impacted ampullary stone, and         multiple other smaller biliary stones.                       - Successful creation of fistulotomy from duodenum to                        bile duct and extraction of the biliary stones, and                        purulent material.           - All stones were removed from the bile duct.                       - Enterosocpy performed to capture the large extracted                        stone. The stone was fragmented during enteroscopy                        therefore was not removed.  Recommendation:      - Post ERCP care in PACU as per protocol.                       - Continue LR in recovery room.                       - Avoid NSAIDs for 14 days.                       - Resume diet as tolerated.                       - Hold anticoagulation for 48 hours.                       - Start antibiotics for cholangitis. Can consider                        Levofloxacin.                       - Resume home medications.                       - Monitor for bleeding, pancreatitis, and perforation.                       - Surgical evaluation for cholecystectomy. Timing of                        cholecystectomy as per surgical team.                       - Further management as per admitting team.                                                  Attending Participation:       I was present and participated during the entire procedure, including        non-key portions.         ______________  Dillon Delvalle,   11/5/2024 3:52:11 PM    < end of copied text >

## 2024-11-06 NOTE — PROGRESS NOTE ADULT - PROBLEM SELECTOR PLAN 1
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 Elevated alk phos, T. bili, and GGT concerning for cholestatic picture. MRCP with concern for obstructive two choledocholithiasis with intra and extrahepatic biliary ductal dilatation, and in the context of daily downtrending bili, may make pancreatic mass seen on initial CTAP less likely to represent malignant mass but rather two obstructing stones together.     - F/u GI recs: plan for ERCP, EUS on 11/5, CT chest for staging (no masses visualized on CTPA), will need oncology and surgical oncology evaluation  - f/u: CEA WNL, CA 19-9: 1506 Periampullary mass found on CTAP, with intrahepatic bile duct dilation. Elevated alk phos, T. bili, and GGT concerning for cholestatic picture. MRCP with concern for obstructive two choledocholithiasis with intra and extrahepatic biliary ductal dilatation, and in the context of daily downtrending bili, may make pancreatic mass seen on initial CTAP less likely to represent malignant mass but rather two obstructing stones together.   s/p ERCP with GI on 11/5 with no concern for mass and extraction of stones performed consistent with cholangitis     - GI recs appreciated: diet as tolerated, consider levofloxacin for treatment of cholangitis, monitor for bleeding, pancreatitis, and perforation, surgical eval for cholecystectomy   - f/u: CEA WNL, CA 19-9: 1506 Pt came in with abdominal for one week, initial CTAP done in ED concerning for periampullary mass with intrahepatic duct dilation, associated with elevated alk phos, t. direct bili, and GGT concerning for cholestatic picture. MRCP on 11/5 with concern for obstructive two choledocholithiasis with intra and extrahepatic biliary ductal dilatation, and in the context of daily downtrending bili, may make pancreatic mass seen on initial CTAP less likely to represent malignant mass but rather two obstructing stones together.   s/p ERCP with GI on 11/5 with no concern for mass and extraction of stones performed consistent with cholangitis secondary to choledocholithiasis.      - GI recs appreciated: diet as tolerated, levofloxacin for treatment of cholangitis, monitor for bleeding, pancreatitis, and perforation, surgical eval for cholecystectomy   - f/u: CEA WNL, CA 19-9: 1506  - f/u surg eval for cholecystectomy Pt came in with abdominal for one week, initial CTAP done in ED concerning for periampullary mass with intrahepatic duct dilation, associated with elevated alk phos, t. direct bili, and GGT concerning for cholestatic picture. MRCP on 11/5 with concern for obstructive two choledocholithiasis with intra and extrahepatic biliary ductal dilatation, and in the context of daily downtrending bili, may make pancreatic mass seen on initial CTAP less likely to represent malignant mass but rather two obstructing stones together.   s/p ERCP with GI on 11/5 with no concern for mass and extraction of stones performed consistent with cholangitis secondary to choledocholithiasis.     - GI recs appreciated: diet as tolerated, levofloxacin for treatment of cholangitis, monitor for bleeding, pancreatitis, and perforation, surgical eval for cholecystectomy   - f/u: CEA WNL, CA 19-9: 1506  - f/u surg eval for cholecystectomy  - low concern for post-ERCP complications 11/6, continue encouraging PO as tolerated

## 2024-11-06 NOTE — PROGRESS NOTE ADULT - PROBLEM SELECTOR PLAN 3
Patient with history of not being to sleep flat, and new voice hoarseness, with no lower extremity edema. Unlikely to be CHF, however cannot rule out at this point. Pt describes SOB when laying on abdomen and not flat on back over the last three days, less concern for acute cardio/pulm etiology. Rule out PE iso elevated D-dimer, and potential malignancy. Can be due to abdominal distension and positioning.     s/p CTPA (11/4): no PE or masses visualized, elevated D-dimer likely iso malignancy     - F/u TTE results: EF 55% and grade 1 diastolic dysfunction.   - PT eval: outpatient PT   - CXR: bibasilar atelactasis Patient with history of not being to sleep flat, and new voice hoarseness, with no lower extremity edema. Unlikely to be CHF, however cannot rule out at this point. Pt describes SOB when laying on abdomen and not flat on back over the last three days, less concern for acute cardio/pulm etiology. Rule out PE iso elevated D-dimer, and potential malignancy. Can be due to abdominal distension and positioning.     s/p CTPA (11/4): no PE or masses visualized, elevated D-dimer    - F/u TTE results: EF 55% and grade 1 diastolic dysfunction.   - PT eval: outpatient PT   - CXR: bibasilar atelactasis Patient with week-long history of migratory abdominal pain, likely related to mass found on imaging. Constipation can be another contributing factor as abdomen is distended on exam, CT abdomen revealed stool burden, and patient reports he is constipated. Low concern for peritonitis at this time, serial abdominal exams deferred. MRCP showing concern for choledocholithiasis.    - oxy 2.5 q6 PRN for mild and oxy 5q6 for moderate/severe pain   - F/u GI recs: see pancreatic mass  - Trend LFT's, bilirubin: downtrending --> more consistent with choledocholithiasis than permanent obstruction in the form of a mass  - Bowel regimen for constipation Patient with history of not being to sleep flat, and new voice hoarseness, with no lower extremity edema. Unlikely to be CHF, and echo as below. Pt describes SOB when laying on abdomen and not flat on back over the last three days, less concern for acute cardio/pulm etiology. Ruled out PE iso elevated D-dimer, and potential malignancy. Can be due to abdominal distension and positioning.     s/p CTPA (11/4): no PE or masses visualized, elevated D-dimer    - F/u TTE results: EF 55% and grade 1 diastolic dysfunction.   - PT eval: outpatient PT   - CXR: bibasilar atelactasis

## 2024-11-06 NOTE — PROGRESS NOTE ADULT - PROBLEM SELECTOR PLAN 5
Patient with new onset gait disturbance and history of hand tremors, potentially concerning for Parkinson's disease.    - PT eval: outpatient PT   - F/u TSH, B12, B9: largely unremarkable   - Revaluate gait when patient regains strength Total bilirubin elevated to 8 on admission, cholestatic picture including elevations in GGT, Alk Phos, and LFT's. 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     - Continue to trend: downtrending   - see pancreatic mass, likely iso of choledocho --> cholangitis

## 2024-11-06 NOTE — PROGRESS NOTE ADULT - PROBLEM SELECTOR PLAN 7
As above Patient with history of fatigue, unclear etiology however likely related to pancreatic mass; hemoglobin wnl     - Encourage PO intake   - F/u nutrition consult: lactose free diet, increase protein intake, orgain vegan vanilla 2x daily    - F/u TSH, B12, B9 levels: largely unremarkable

## 2024-11-06 NOTE — PROGRESS NOTE ADULT - PROBLEM SELECTOR PLAN 6
Patient with history of fatigue, unclear etiology however likely related to pancreatic mass; hemoglobin wnl     - Encourage PO intake   - F/u nutrition consult: lactose free diet, increase protein intake, orgain vegan vanilla 2x daily    - F/u TSH, B12, B9 levels: largely unremarkable Patient with new onset gait disturbance and history of hand tremors, potentially concerning for Parkinson's disease.    - PT eval: outpatient PT   - F/u TSH, B12, B9: largely unremarkable   - Revaluate gait when patient regains strength Patient with history of fatigue, likely related to cholangitis 2/2 choledocho; hemoglobin wnl     - Encourage PO intake   - F/u nutrition consult: lactose free diet, increase protein intake, orgain vegan vanilla 2x daily    - F/u TSH, B12, B9 levels: largely unremarkable

## 2024-11-06 NOTE — PROGRESS NOTE ADULT - SUBJECTIVE AND OBJECTIVE BOX
CHIEF COMPLAINT: abdominal pain and weakness    Overnight Events: None  Interval Events: CTPA negative for PE, atelectasis, no visualized lung mass    Subjective: Patient at procedure this morning. Unable to elicit.   No F/C, N/V, CP, SOB, Cough, lightheadedness, dizziness, abdominal pain, diarrhea, dysuria.      OBJECTIVE:  ICU Vital Signs Last 24 Hrs  T(C): 36.7 (05 Nov 2024 06:10), Max: 36.7 (04 Nov 2024 22:07)  T(F): 98 (05 Nov 2024 06:10), Max: 98 (04 Nov 2024 22:07)  HR: 73 (05 Nov 2024 06:10) (73 - 78)  BP: 117/79 (05 Nov 2024 06:10) (114/74 - 117/79)  BP(mean): --  ABP: --  ABP(mean): --  RR: 18 (05 Nov 2024 06:10) (17 - 18)  SpO2: 98% (05 Nov 2024 06:10) (98% - 99%)    O2 Parameters below as of 05 Nov 2024 06:10  Patient On (Oxygen Delivery Method): room air      POCT Blood Glucose.: 146 mg/dL (03 Nov 2024 09:59)      PHYSICAL EXAM  General:   Head:    Eyes:   Neck:   Cardiac:   Lungs:   Abdomen:   Extremities:   Neuro  Psych:   Skin:  Etc:      HOSPITAL MEDICATIONS:  MEDICATIONS  (STANDING):  chlorhexidine 2% Cloths 1 Application(s) Topical daily  enoxaparin Injectable 40 milliGRAM(s) SubCutaneous every 24 hours  influenza  Vaccine (HIGH DOSE) 0.5 milliLiter(s) IntraMuscular once  lactated ringers. 1000 milliLiter(s) (50 mL/Hr) IV Continuous <Continuous>  lidocaine   4% Patch 1 Patch Transdermal daily  melatonin 6 milliGRAM(s) Oral at bedtime  polyethylene glycol 3350 17 Gram(s) Oral two times a day  senna 2 Tablet(s) Oral at bedtime    MEDICATIONS  (PRN):  magnesium hydroxide Suspension 30 milliLiter(s) Oral daily PRN Constipation  oxyCODONE    IR 5 milliGRAM(s) Oral every 6 hours PRN Severe Pain (7 - 10)  oxyCODONE    IR 2.5 milliGRAM(s) Oral every 6 hours PRN Moderate Pain (4 - 6)      LABS:                        13.0   12.16 )-----------( 165      ( 05 Nov 2024 06:30 )             38.0     Hgb Trend: 13.0<--, 12.8<--, 13.4<--  11-05    140  |  103  |  18  ----------------------------<  111[H]  3.8   |  25  |  0.76    Ca    8.3[L]      05 Nov 2024 06:30  Phos  3.1     11-05  Mg     2.50     11-05    TPro  6.5  /  Alb  3.0[L]  /  TBili  2.7[H]  /  DBili  x   /  AST  40  /  ALT  100[H]  /  AlkPhos  264[H]  11-05    Creatinine Trend: 0.76<--, 0.81<--, 1.05<--  PT/INR - ( 05 Nov 2024 06:30 )   PT: 12.6 sec;   INR: 1.06 ratio         PTT - ( 05 Nov 2024 06:30 )  PTT:30.6 sec  Urinalysis Basic - ( 05 Nov 2024 06:30 )    Color: x / Appearance: x / SG: x / pH: x  Gluc: 111 mg/dL / Ketone: x  / Bili: x / Urobili: x   Blood: x / Protein: x / Nitrite: x   Leuk Esterase: x / RBC: x / WBC x   Sq Epi: x / Non Sq Epi: x / Bacteria: x            MICROBIOLOGY:     Urinalysis with Rflx Culture (collected 03 Nov 2024 15:00)        RADIOLOGY:  [x] Reviewed by me CHIEF COMPLAINT: abdominal pain and weakness     Overnight Events: none  Interval Events: s/p ERCP with GI on 11/5     SUBJECTIVE:     OBJECTIVE:  ICU Vital Signs Last 24 Hrs  T(C): 36.5 (06 Nov 2024 06:00), Max: 37 (05 Nov 2024 13:07)  T(F): 97.7 (06 Nov 2024 06:00), Max: 98.6 (05 Nov 2024 13:07)  HR: 60 (06 Nov 2024 06:00) (60 - 80)  BP: 120/70 (06 Nov 2024 06:00) (110/69 - 129/73)  BP(mean): --  ABP: --  ABP(mean): --  RR: 18 (06 Nov 2024 06:00) (18 - 22)  SpO2: 98% (06 Nov 2024 06:00) (95% - 99%)    O2 Parameters below as of 06 Nov 2024 06:00  Patient On (Oxygen Delivery Method): room air              11-05 @ 07:01  -  11-06 @ 07:00  --------------------------------------------------------  IN: 220 mL / OUT: 650 mL / NET: -430 mL      CAPILLARY BLOOD GLUCOSE          PHYSICAL EXAM  General:   Head:    Eyes:   Neck:   Cardiac:   Lungs:   Abdomen:   Extremities:   Neuro  Psych:   Skin:  Etc:      HOSPITAL MEDICATIONS:  MEDICATIONS  (STANDING):  chlorhexidine 2% Cloths 1 Application(s) Topical daily  enoxaparin Injectable 40 milliGRAM(s) SubCutaneous every 24 hours  influenza  Vaccine (HIGH DOSE) 0.5 milliLiter(s) IntraMuscular once  lactated ringers. 1000 milliLiter(s) (50 mL/Hr) IV Continuous <Continuous>  lidocaine   4% Patch 1 Patch Transdermal daily  melatonin 6 milliGRAM(s) Oral at bedtime  polyethylene glycol 3350 17 Gram(s) Oral two times a day  senna 2 Tablet(s) Oral at bedtime    MEDICATIONS  (PRN):  magnesium hydroxide Suspension 30 milliLiter(s) Oral daily PRN Constipation  oxyCODONE    IR 5 milliGRAM(s) Oral every 6 hours PRN Severe Pain (7 - 10)  oxyCODONE    IR 2.5 milliGRAM(s) Oral every 6 hours PRN Moderate Pain (4 - 6)      LABS:                        12.3   14.18 )-----------( 207      ( 06 Nov 2024 06:10 )             37.9     Hgb Trend: 12.3<--, 13.0<--, 12.8<--, 13.4<--  11-05    140  |  103  |  18  ----------------------------<  111[H]  3.8   |  25  |  0.76    Ca    8.3[L]      05 Nov 2024 06:30  Phos  3.1     11-05  Mg     2.50     11-05    TPro  6.5  /  Alb  3.0[L]  /  TBili  2.7[H]  /  DBili  x   /  AST  40  /  ALT  100[H]  /  AlkPhos  264[H]  11-05    Creatinine Trend: 0.76<--, 0.81<--, 1.05<--  PT/INR - ( 05 Nov 2024 06:30 )   PT: 12.6 sec;   INR: 1.06 ratio         PTT - ( 05 Nov 2024 06:30 )  PTT:30.6 sec  Urinalysis Basic - ( 05 Nov 2024 06:30 )    Color: x / Appearance: x / SG: x / pH: x  Gluc: 111 mg/dL / Ketone: x  / Bili: x / Urobili: x   Blood: x / Protein: x / Nitrite: x   Leuk Esterase: x / RBC: x / WBC x   Sq Epi: x / Non Sq Epi: x / Bacteria: x            MICROBIOLOGY:     Urinalysis with Rflx Culture (collected 03 Nov 2024 15:00)        RADIOLOGY:  [ ] Reviewed by me       CHIEF COMPLAINT: abdominal pain and weakness     Overnight Events: none  Interval Events: s/p ERCP with GI on 11/5     SUBJECTIVE: Feels well. No fever, chills, CP, SOB. No N/V, abdominal pain. Still has minimal appetite, though this has been unchanged. Passing gas, has not had a BM since 2 days ago.    OBJECTIVE:  ICU Vital Signs Last 24 Hrs  T(C): 36.5 (06 Nov 2024 06:00), Max: 37 (05 Nov 2024 13:07)  T(F): 97.7 (06 Nov 2024 06:00), Max: 98.6 (05 Nov 2024 13:07)  HR: 60 (06 Nov 2024 06:00) (60 - 80)  BP: 120/70 (06 Nov 2024 06:00) (110/69 - 129/73)  BP(mean): --  ABP: --  ABP(mean): --  RR: 18 (06 Nov 2024 06:00) (18 - 22)  SpO2: 98% (06 Nov 2024 06:00) (95% - 99%)    O2 Parameters below as of 06 Nov 2024 06:00  Patient On (Oxygen Delivery Method): room air              11-05 @ 07:01  -  11-06 @ 07:00  --------------------------------------------------------  IN: 220 mL / OUT: 650 mL / NET: -430 mL      CAPILLARY BLOOD GLUCOSE          PHYSICAL EXAM  General: NAD  Head: atraumatic   Eyes: Anicteric   Neck: No JVD  Cardiac: Normal s1, s2, no murmurs, rubs, gallops   Lungs: Crackles appreciated at the bases, clear otherwise.  Abdomen: Distended but soft, no tenderness to palpation, + BS   Extremities: No pedal edema   Neuro: Fully alert and oriented.      HOSPITAL MEDICATIONS:  MEDICATIONS  (STANDING):  chlorhexidine 2% Cloths 1 Application(s) Topical daily  enoxaparin Injectable 40 milliGRAM(s) SubCutaneous every 24 hours  influenza  Vaccine (HIGH DOSE) 0.5 milliLiter(s) IntraMuscular once  lactated ringers. 1000 milliLiter(s) (50 mL/Hr) IV Continuous <Continuous>  lidocaine   4% Patch 1 Patch Transdermal daily  melatonin 6 milliGRAM(s) Oral at bedtime  polyethylene glycol 3350 17 Gram(s) Oral two times a day  senna 2 Tablet(s) Oral at bedtime    MEDICATIONS  (PRN):  magnesium hydroxide Suspension 30 milliLiter(s) Oral daily PRN Constipation  oxyCODONE    IR 5 milliGRAM(s) Oral every 6 hours PRN Severe Pain (7 - 10)  oxyCODONE    IR 2.5 milliGRAM(s) Oral every 6 hours PRN Moderate Pain (4 - 6)      LABS:                        12.3   14.18 )-----------( 207      ( 06 Nov 2024 06:10 )             37.9     Hgb Trend: 12.3<--, 13.0<--, 12.8<--, 13.4<--  11-05    140  |  103  |  18  ----------------------------<  111[H]  3.8   |  25  |  0.76    Ca    8.3[L]      05 Nov 2024 06:30  Phos  3.1     11-05  Mg     2.50     11-05    TPro  6.5  /  Alb  3.0[L]  /  TBili  2.7[H]  /  DBili  x   /  AST  40  /  ALT  100[H]  /  AlkPhos  264[H]  11-05    Creatinine Trend: 0.76<--, 0.81<--, 1.05<--  PT/INR - ( 05 Nov 2024 06:30 )   PT: 12.6 sec;   INR: 1.06 ratio         PTT - ( 05 Nov 2024 06:30 )  PTT:30.6 sec  Urinalysis Basic - ( 05 Nov 2024 06:30 )    Color: x / Appearance: x / SG: x / pH: x  Gluc: 111 mg/dL / Ketone: x  / Bili: x / Urobili: x   Blood: x / Protein: x / Nitrite: x   Leuk Esterase: x / RBC: x / WBC x   Sq Epi: x / Non Sq Epi: x / Bacteria: x            MICROBIOLOGY:     Urinalysis with Rflx Culture (collected 03 Nov 2024 15:00)        RADIOLOGY:  [ ] Reviewed by me

## 2024-11-06 NOTE — CONSULT NOTE ADULT - ASSESSMENT
79M w/ no medical or surgical history, s/p EUS/ERCP on 11/5 for choledocholithiasis w/ cholangitis. Surgery consulted for cholecystectomy.    Recommendations:  -Discussed risks, benefits, and alternative of cholecystectomy with patient and sons bedside. All are in agreement to proceed with surgery on this admission.  -Please make NPO past midnight and order preop labs including CBC, CMP, magnesium, phos, serum lipase, PT/PTT/INR, and type and screen.    Discussed with Dr. Ramirez.    B Team Surgery  Please page [phone] for all questions.   79M w/ no medical or surgical history, s/p EUS/ERCP on 11/5 for choledocholithiasis w/ cholangitis. Surgery consulted for cholecystectomy.    Recommendations:  -Discussed risks, benefits, and alternative of cholecystectomy with patient and sons bedside. All are in agreement to proceed with surgery on this admission.   -Patient consented and added-on to OR schedule for laparoscopic cholecystectomy, possible open on 11/7.  -Please make NPO past midnight tonight and order preop labs in AM including CBC, CMP, magnesium, phos, serum lipase, PT/PTT/INR, and type and screen.    Discussed with Dr. Ramirez.    B Team Surgery  Please page [phone] for all questions.   79M w/ no medical or surgical history, s/p EUS/ERCP on 11/5 for choledocholithiasis w/ cholangitis. Surgery consulted for cholecystectomy.    Recommendations:  -Defer cholecystectomy on this admission iso cholangitic inflammation.  -When discharged, please send with [facility].  -Please follow-up outpatient with Dr. Payton Ramirez two weeks after discharge. Please call her office to schedule an appointment.    Discussed with Dr. Ramirez.    B Team Surgery  Please page [phone] for all questions.

## 2024-11-07 LAB
ALBUMIN SERPL ELPH-MCNC: 3.1 G/DL — LOW (ref 3.3–5)
ALP SERPL-CCNC: 207 U/L — HIGH (ref 40–120)
ALT FLD-CCNC: 63 U/L — HIGH (ref 4–41)
ANION GAP SERPL CALC-SCNC: 13 MMOL/L — SIGNIFICANT CHANGE UP (ref 7–14)
AST SERPL-CCNC: 30 U/L — SIGNIFICANT CHANGE UP (ref 4–40)
BASOPHILS # BLD AUTO: 0.03 K/UL — SIGNIFICANT CHANGE UP (ref 0–0.2)
BASOPHILS NFR BLD AUTO: 0.2 % — SIGNIFICANT CHANGE UP (ref 0–2)
BILIRUB SERPL-MCNC: 1.7 MG/DL — HIGH (ref 0.2–1.2)
BUN SERPL-MCNC: 19 MG/DL — SIGNIFICANT CHANGE UP (ref 7–23)
CALCIUM SERPL-MCNC: 8.2 MG/DL — LOW (ref 8.4–10.5)
CHLORIDE SERPL-SCNC: 105 MMOL/L — SIGNIFICANT CHANGE UP (ref 98–107)
CO2 SERPL-SCNC: 24 MMOL/L — SIGNIFICANT CHANGE UP (ref 22–31)
CREAT SERPL-MCNC: 0.84 MG/DL — SIGNIFICANT CHANGE UP (ref 0.5–1.3)
EGFR: 88 ML/MIN/1.73M2 — SIGNIFICANT CHANGE UP
EOSINOPHIL # BLD AUTO: 0.11 K/UL — SIGNIFICANT CHANGE UP (ref 0–0.5)
EOSINOPHIL NFR BLD AUTO: 0.8 % — SIGNIFICANT CHANGE UP (ref 0–6)
GLUCOSE SERPL-MCNC: 107 MG/DL — HIGH (ref 70–99)
HCT VFR BLD CALC: 35.9 % — LOW (ref 39–50)
HGB BLD-MCNC: 11.8 G/DL — LOW (ref 13–17)
IANC: 10.35 K/UL — HIGH (ref 1.8–7.4)
IMM GRANULOCYTES NFR BLD AUTO: 1.1 % — HIGH (ref 0–0.9)
LYMPHOCYTES # BLD AUTO: 1.8 K/UL — SIGNIFICANT CHANGE UP (ref 1–3.3)
LYMPHOCYTES # BLD AUTO: 13.9 % — SIGNIFICANT CHANGE UP (ref 13–44)
MAGNESIUM SERPL-MCNC: 2.4 MG/DL — SIGNIFICANT CHANGE UP (ref 1.6–2.6)
MCHC RBC-ENTMCNC: 28.2 PG — SIGNIFICANT CHANGE UP (ref 27–34)
MCHC RBC-ENTMCNC: 32.9 G/DL — SIGNIFICANT CHANGE UP (ref 32–36)
MCV RBC AUTO: 85.7 FL — SIGNIFICANT CHANGE UP (ref 80–100)
MONOCYTES # BLD AUTO: 0.55 K/UL — SIGNIFICANT CHANGE UP (ref 0–0.9)
MONOCYTES NFR BLD AUTO: 4.2 % — SIGNIFICANT CHANGE UP (ref 2–14)
NEUTROPHILS # BLD AUTO: 10.35 K/UL — HIGH (ref 1.8–7.4)
NEUTROPHILS NFR BLD AUTO: 79.8 % — HIGH (ref 43–77)
NRBC # BLD: 0 /100 WBCS — SIGNIFICANT CHANGE UP (ref 0–0)
NRBC # FLD: 0 K/UL — SIGNIFICANT CHANGE UP (ref 0–0)
PHOSPHATE SERPL-MCNC: 2.7 MG/DL — SIGNIFICANT CHANGE UP (ref 2.5–4.5)
PLATELET # BLD AUTO: 209 K/UL — SIGNIFICANT CHANGE UP (ref 150–400)
POTASSIUM SERPL-MCNC: 4.3 MMOL/L — SIGNIFICANT CHANGE UP (ref 3.5–5.3)
POTASSIUM SERPL-SCNC: 4.3 MMOL/L — SIGNIFICANT CHANGE UP (ref 3.5–5.3)
PROT SERPL-MCNC: 6.2 G/DL — SIGNIFICANT CHANGE UP (ref 6–8.3)
RBC # BLD: 4.19 M/UL — LOW (ref 4.2–5.8)
RBC # FLD: 14.6 % — HIGH (ref 10.3–14.5)
SODIUM SERPL-SCNC: 142 MMOL/L — SIGNIFICANT CHANGE UP (ref 135–145)
WBC # BLD: 12.98 K/UL — HIGH (ref 3.8–10.5)
WBC # FLD AUTO: 12.98 K/UL — HIGH (ref 3.8–10.5)

## 2024-11-07 PROCEDURE — 99233 SBSQ HOSP IP/OBS HIGH 50: CPT | Mod: GC

## 2024-11-07 RX ADMIN — CHLORHEXIDINE GLUCONATE 1 APPLICATION(S): 40 SOLUTION TOPICAL at 11:04

## 2024-11-07 NOTE — PROGRESS NOTE ADULT - PROBLEM SELECTOR PLAN 1
Pt came in with abdominal for one week, initial CTAP done in ED concerning for periampullary mass with intrahepatic duct dilation, associated with elevated alk phos, t. direct bili, and GGT concerning for cholestatic picture. MRCP on 11/5 with concern for obstructive two choledocholithiasis with intra and extrahepatic biliary ductal dilatation, and in the context of daily downtrending bili, may make pancreatic mass seen on initial CTAP less likely to represent malignant mass but rather two obstructing stones together.   s/p ERCP with GI on 11/5 with no concern for mass and extraction of stones performed consistent with cholangitis secondary to choledocholithiasis.     - GI recs appreciated: diet as tolerated, levofloxacin for treatment of cholangitis, monitor for bleeding, pancreatitis, and perforation, surgical eval for cholecystectomy   - f/u: CEA WNL, CA 19-9: 1506  - f/u surg eval for cholecystectomy  - low concern for post-ERCP complications 11/6, continue encouraging PO as tolerated Pt came in with abdominal for one week, initial CTAP done in ED concerning for periampullary mass with intrahepatic duct dilation, associated with elevated alk phos, t. direct bili, and GGT concerning for cholestatic picture. MRCP on 11/5 with concern for obstructive two choledocholithiasis with intra and extrahepatic biliary ductal dilatation, and in the context of daily downtrending bili, may make pancreatic mass seen on initial CTAP less likely to represent malignant mass but rather two obstructing stones together.   s/p ERCP with GI on 11/5 with no concern for mass and extraction of stones performed consistent with cholangitis secondary to choledocholithiasis.     - GI recs appreciated: diet as tolerated, levofloxacin for treatment of cholangitis, monitor for bleeding, pancreatitis, and perforation, surgical eval for cholecystectomy   - f/u: CEA WNL, CA 19-9: 1506  - f/u surg eval for cholecystectomy: deferred at this time due to cholangitic inflammation, with outpatient follow up   - low concern for post-ERCP complications 11/6, continue encouraging PO as tolerated Pt came in with abdominal for one week, initial CTAP done in ED concerning for periampullary mass with intrahepatic duct dilation, associated with elevated alk phos, t. direct bili, and GGT concerning for cholestatic picture. MRCP on 11/5 with concern for obstructive two choledocholithiasis with intra and extrahepatic biliary ductal dilatation, and in the context of daily downtrending bili, may make pancreatic mass seen on initial CTAP less likely to represent malignant mass but rather two obstructing stones together.   s/p ERCP with GI on 11/5 with no concern for mass and extraction of stones performed consistent with cholangitis secondary to choledocholithiasis.     - GI recs appreciated: diet as tolerated, levofloxacin for treatment of cholangitis, monitor for bleeding, pancreatitis, and perforation, surgical eval for cholecystectomy   - f/u: CEA WNL, CA 19-9: 1506  - f/u surg eval for cholecystectomy: deferred at this time due to cholangitic inflammation, with outpatient follow up   - low concern for post-ERCP complications 11/6 and 11/7, continue encouraging PO as tolerated  - dc on levofloxacin (11/6-) for 5-7 day course?

## 2024-11-07 NOTE — PROGRESS NOTE ADULT - PROBLEM SELECTOR PLAN 8
- Fluids: s/p LR @50 cc/hr for 20 hours  - Electrolytes: Will replete to maintain K>4, Phos>3, and Mag>2  - Nutrition: Regular diet   - DVT Prophylaxis: Lovenox (hold for 48 hrs s/p ERCP)   - Disposition: Pending surgery eval - Fluids: s/p LR @50 cc/hr for 20 hours  - Electrolytes: Will replete to maintain K>4, Phos>3, and Mag>2  - Nutrition: Regular diet   - DVT Prophylaxis: Lovenox (hold for 48 hrs s/p ERCP)   - Disposition: Home pending good PO intake today

## 2024-11-07 NOTE — PROGRESS NOTE ADULT - TIME BILLING
Preparing to see the patient including review of tests and other providers' notes, confirming history with family member, performing medical examination and evaluation, counseling and educating the patient/family/caregiver, ordering medications, tests and procedures, communicating with other health care professionals, documenting clinical information in the EMR, independently interpreting results and communicating results to the patient/family/caregiven, care coordination.

## 2024-11-07 NOTE — PROGRESS NOTE ADULT - PROBLEM SELECTOR PLAN 2
Patient with week-long history of migratory abdominal pain, likely related to mass found on imaging. Constipation can be another contributing factor as abdomen is distended on exam, CT abdomen revealed stool burden, and patient reports he is constipated. Low concern for peritonitis at this time, serial abdominal exams deferred. MRCP showing concern for choledocholithiasis. s/p ERCP with stone extraction 11/6    - oxy 2.5 q6 PRN for mild and oxy 5q6 for moderate/severe pain   - F/u GI recs: see cholangitis   - Trend LFT's, bilirubin: downtrending --> more consistent with choledocholithiasis than permanent obstruction in the form of a mass  - Bowel regimen for constipation

## 2024-11-07 NOTE — PROGRESS NOTE ADULT - ASSESSMENT
Patient is an 79 year old male with no significant past medical history, presenting to the ED for history of abdominal pain and weakness. Labs and CT A/P were initially reflective of pancreatic mass with biliary obstruction, concerning for pancreatic malignancy, complicated by orthopnea, s/p ERCP with GI on 11/5 with extraction of multiple stones from bile duct and no concern for mass, now admitted for treatment of acute cholangitis secondary to choledocholithiasis and evaluation for cholecystectomy       Patient is an 79 year old male with no significant past medical history, presenting to the ED for history of abdominal pain and weakness. Labs and CT A/P were initially reflective of pancreatic mass with biliary obstruction, concerning for pancreatic malignancy, complicated by orthopnea, s/p ERCP with GI on 11/5 with extraction of multiple stones from bile duct and no concern for mass, now admitted for treatment of acute cholangitis secondary to choledocholithiasis pending dc       Patient is an 79 year old male with no significant past medical history, presenting to the ED for history of abdominal pain and weakness. Labs and CT A/P were initially reflective of pancreatic mass with biliary obstruction, concerning for pancreatic malignancy, complicated by orthopnea, s/p ERCP with GI on 11/5 with extraction of multiple stones from bile duct and no concern for mass, now admitted for treatment of acute cholangitis secondary to choledocholithiasis pending dc if tolerating PO.

## 2024-11-07 NOTE — PROGRESS NOTE ADULT - PROBLEM SELECTOR PLAN 6
Patient with history of fatigue, likely related to cholangitis 2/2 choledocho; hemoglobin wnl     - Encourage PO intake   - F/u nutrition consult: lactose free diet, increase protein intake, orgain vegan vanilla 2x daily    - F/u TSH, B12, B9 levels: largely unremarkable

## 2024-11-07 NOTE — PROGRESS NOTE ADULT - PROBLEM SELECTOR PLAN 5
Patient with new onset gait disturbance and history of hand tremors, potentially concerning for Parkinson's disease.    - PT eval: outpatient PT   - F/u TSH, B12, B9: largely unremarkable   - Revaluate gait when patient regains strength Patient with new onset gait disturbance and history of hand tremors, potentially concerning for Parkinson's disease.    - PT eval: outpatient PT   - F/u TSH, B12, B9: largely unremarkable   - Revaluate gait when patient regains strength, able to walk well with assistance of walker

## 2024-11-07 NOTE — PROGRESS NOTE ADULT - SUBJECTIVE AND OBJECTIVE BOX
CHIEF COMPLAINT: abdominal pain and weakness     Overnight Events: none  Interval Events: s/p ERCP with GI on 11/5     SUBJECTIVE: Feels well. No fever, chills, CP, SOB. No N/V, abdominal pain. Still has minimal appetite, though this has been unchanged. Passing gas, has not had a BM since 2 days ago.    OBJECTIVE:  ICU Vital Signs Last 24 Hrs  T(C): 36.5 (06 Nov 2024 06:00), Max: 37 (05 Nov 2024 13:07)  T(F): 97.7 (06 Nov 2024 06:00), Max: 98.6 (05 Nov 2024 13:07)  HR: 60 (06 Nov 2024 06:00) (60 - 80)  BP: 120/70 (06 Nov 2024 06:00) (110/69 - 129/73)  BP(mean): --  ABP: --  ABP(mean): --  RR: 18 (06 Nov 2024 06:00) (18 - 22)  SpO2: 98% (06 Nov 2024 06:00) (95% - 99%)    O2 Parameters below as of 06 Nov 2024 06:00  Patient On (Oxygen Delivery Method): room air              11-05 @ 07:01  -  11-06 @ 07:00  --------------------------------------------------------  IN: 220 mL / OUT: 650 mL / NET: -430 mL      CAPILLARY BLOOD GLUCOSE          PHYSICAL EXAM  General: NAD  Head: atraumatic   Eyes: Anicteric   Neck: No JVD  Cardiac: Normal s1, s2, no murmurs, rubs, gallops   Lungs: Crackles appreciated at the bases, clear otherwise.  Abdomen: Distended but soft, no tenderness to palpation, + BS   Extremities: No pedal edema   Neuro: Fully alert and oriented.      HOSPITAL MEDICATIONS:  MEDICATIONS  (STANDING):  chlorhexidine 2% Cloths 1 Application(s) Topical daily  enoxaparin Injectable 40 milliGRAM(s) SubCutaneous every 24 hours  influenza  Vaccine (HIGH DOSE) 0.5 milliLiter(s) IntraMuscular once  lactated ringers. 1000 milliLiter(s) (50 mL/Hr) IV Continuous <Continuous>  lidocaine   4% Patch 1 Patch Transdermal daily  melatonin 6 milliGRAM(s) Oral at bedtime  polyethylene glycol 3350 17 Gram(s) Oral two times a day  senna 2 Tablet(s) Oral at bedtime    MEDICATIONS  (PRN):  magnesium hydroxide Suspension 30 milliLiter(s) Oral daily PRN Constipation  oxyCODONE    IR 5 milliGRAM(s) Oral every 6 hours PRN Severe Pain (7 - 10)  oxyCODONE    IR 2.5 milliGRAM(s) Oral every 6 hours PRN Moderate Pain (4 - 6)      LABS:                        12.3   14.18 )-----------( 207      ( 06 Nov 2024 06:10 )             37.9     Hgb Trend: 12.3<--, 13.0<--, 12.8<--, 13.4<--  11-05    140  |  103  |  18  ----------------------------<  111[H]  3.8   |  25  |  0.76    Ca    8.3[L]      05 Nov 2024 06:30  Phos  3.1     11-05  Mg     2.50     11-05    TPro  6.5  /  Alb  3.0[L]  /  TBili  2.7[H]  /  DBili  x   /  AST  40  /  ALT  100[H]  /  AlkPhos  264[H]  11-05    Creatinine Trend: 0.76<--, 0.81<--, 1.05<--  PT/INR - ( 05 Nov 2024 06:30 )   PT: 12.6 sec;   INR: 1.06 ratio         PTT - ( 05 Nov 2024 06:30 )  PTT:30.6 sec  Urinalysis Basic - ( 05 Nov 2024 06:30 )    Color: x / Appearance: x / SG: x / pH: x  Gluc: 111 mg/dL / Ketone: x  / Bili: x / Urobili: x   Blood: x / Protein: x / Nitrite: x   Leuk Esterase: x / RBC: x / WBC x   Sq Epi: x / Non Sq Epi: x / Bacteria: x            MICROBIOLOGY:     Urinalysis with Rflx Culture (collected 03 Nov 2024 15:00)        RADIOLOGY:  [ ] Reviewed by me       CHIEF COMPLAINT: abdominal pain and weakness    Overnight Events: None  Interval Events: Gen surg eval for potential CCY during admission, deferred at this time with outpatient follow up.    Subjective: Feels well. No fever, chills, CP, SOB. No N/V, abdominal pain. Still has minimal appetite, though this has been unchanged. Passing gas, has not had a BM since 2 days ago.      OBJECTIVE:  ICU Vital Signs Last 24 Hrs  T(C): 36.5 (07 Nov 2024 05:47), Max: 36.8 (06 Nov 2024 22:29)  T(F): 97.7 (07 Nov 2024 05:47), Max: 98.2 (06 Nov 2024 22:29)  HR: 60 (07 Nov 2024 05:47) (60 - 87)  BP: 106/71 (07 Nov 2024 05:47) (105/70 - 113/63)  BP(mean): --  ABP: --  ABP(mean): --  RR: 18 (07 Nov 2024 05:47) (17 - 18)  SpO2: 98% (07 Nov 2024 05:47) (96% - 99%)    O2 Parameters below as of 07 Nov 2024 05:47  Patient On (Oxygen Delivery Method): room air              11-06 @ 07:01  -  11-07 @ 07:00  --------------------------------------------------------  IN: 780 mL / OUT: 800 mL / NET: -20 mL      CAPILLARY BLOOD GLUCOSE    PHYSICAL EXAM  General: NAD  Head: atraumatic   Eyes: Anicteric   Neck: No JVD  Cardiac: Normal s1, s2, no murmurs, rubs, gallops   Lungs: Crackles appreciated at the bases, clear otherwise.  Abdomen: Distended but soft, no tenderness to palpation, + BS   Extremities: No pedal edema   Neuro: Fully alert and oriented.      HOSPITAL MEDICATIONS:  MEDICATIONS  (STANDING):  chlorhexidine 2% Cloths 1 Application(s) Topical daily  influenza  Vaccine (HIGH DOSE) 0.5 milliLiter(s) IntraMuscular once  lactated ringers. 1000 milliLiter(s) (50 mL/Hr) IV Continuous <Continuous>  levoFLOXacin  Tablet 750 milliGRAM(s) Oral every 24 hours  lidocaine   4% Patch 1 Patch Transdermal daily  melatonin 6 milliGRAM(s) Oral at bedtime  polyethylene glycol 3350 17 Gram(s) Oral two times a day  senna 2 Tablet(s) Oral at bedtime    MEDICATIONS  (PRN):  magnesium hydroxide Suspension 30 milliLiter(s) Oral daily PRN Constipation  oxyCODONE    IR 5 milliGRAM(s) Oral every 6 hours PRN Severe Pain (7 - 10)  oxyCODONE    IR 2.5 milliGRAM(s) Oral every 6 hours PRN Moderate Pain (4 - 6)      LABS:                        11.8   12.98 )-----------( 209      ( 07 Nov 2024 05:25 )             35.9     Hgb Trend: 11.8<--, 12.3<--, 13.0<--, 12.8<--, 13.4<--  11-07    142  |  105  |  19  ----------------------------<  107[H]  4.3   |  24  |  0.84    Ca    8.2[L]      07 Nov 2024 05:25  Phos  4.1     11-06  Mg     2.40     11-07    TPro  6.2  /  Alb  3.1[L]  /  TBili  1.7[H]  /  DBili  x   /  AST  30  /  ALT  63[H]  /  AlkPhos  207[H]  11-07    Creatinine Trend: 0.84<--, 0.79<--, 0.76<--, 0.81<--, 1.05<--    Urinalysis Basic - ( 07 Nov 2024 05:25 )    Color: x / Appearance: x / SG: x / pH: x  Gluc: 107 mg/dL / Ketone: x  / Bili: x / Urobili: x   Blood: x / Protein: x / Nitrite: x   Leuk Esterase: x / RBC: x / WBC x   Sq Epi: x / Non Sq Epi: x / Bacteria: x            MICROBIOLOGY:       RADIOLOGY:  [ ] Reviewed by me        Color: x / Appearance: x / SG: x / pH: x  Gluc: 111 mg/dL / Ketone: x  / Bili: x / Urobili: x   Blood: x / Protein: x / Nitrite: x   Leuk Esterase: x / RBC: x / WBC x   Sq Epi: x / Non Sq Epi: x / Bacteria: x            MICROBIOLOGY:     Urinalysis with Rflx Culture (collected 03 Nov 2024 15:00)        RADIOLOGY:  [ ] Reviewed by me       CHIEF COMPLAINT: abdominal pain and weakness    Overnight Events: None  Interval Events: Gen surg eval for potential CCY during admission, deferred at this time with outpatient follow up.    Subjective: Feels well. No fever, chills, CP, SOB. No N/V. Had abdominal pain last night, though relieved after passing gas and BM. Still has minimal appetite, though this has been unchanged. Passing gas, and had BM yesterday      OBJECTIVE:  ICU Vital Signs Last 24 Hrs  T(C): 36.5 (07 Nov 2024 05:47), Max: 36.8 (06 Nov 2024 22:29)  T(F): 97.7 (07 Nov 2024 05:47), Max: 98.2 (06 Nov 2024 22:29)  HR: 60 (07 Nov 2024 05:47) (60 - 87)  BP: 106/71 (07 Nov 2024 05:47) (105/70 - 113/63)  BP(mean): --  ABP: --  ABP(mean): --  RR: 18 (07 Nov 2024 05:47) (17 - 18)  SpO2: 98% (07 Nov 2024 05:47) (96% - 99%)    O2 Parameters below as of 07 Nov 2024 05:47  Patient On (Oxygen Delivery Method): room air              11-06 @ 07:01  -  11-07 @ 07:00  --------------------------------------------------------  IN: 780 mL / OUT: 800 mL / NET: -20 mL      CAPILLARY BLOOD GLUCOSE    PHYSICAL EXAM  General: NAD  Head: atraumatic   Eyes: Anicteric   Neck: No JVD  Cardiac: Normal s1, s2, no murmurs, rubs, gallops   Lungs: Crackles appreciated at the bases, clear otherwise.  Abdomen: Distended but soft, no tenderness to palpation, + BS   Extremities: No pedal edema   Neuro: Fully alert and oriented.      HOSPITAL MEDICATIONS:  MEDICATIONS  (STANDING):  chlorhexidine 2% Cloths 1 Application(s) Topical daily  influenza  Vaccine (HIGH DOSE) 0.5 milliLiter(s) IntraMuscular once  lactated ringers. 1000 milliLiter(s) (50 mL/Hr) IV Continuous <Continuous>  levoFLOXacin  Tablet 750 milliGRAM(s) Oral every 24 hours  lidocaine   4% Patch 1 Patch Transdermal daily  melatonin 6 milliGRAM(s) Oral at bedtime  polyethylene glycol 3350 17 Gram(s) Oral two times a day  senna 2 Tablet(s) Oral at bedtime    MEDICATIONS  (PRN):  magnesium hydroxide Suspension 30 milliLiter(s) Oral daily PRN Constipation  oxyCODONE    IR 5 milliGRAM(s) Oral every 6 hours PRN Severe Pain (7 - 10)  oxyCODONE    IR 2.5 milliGRAM(s) Oral every 6 hours PRN Moderate Pain (4 - 6)      LABS:                        11.8   12.98 )-----------( 209      ( 07 Nov 2024 05:25 )             35.9     Hgb Trend: 11.8<--, 12.3<--, 13.0<--, 12.8<--, 13.4<--  11-07    142  |  105  |  19  ----------------------------<  107[H]  4.3   |  24  |  0.84    Ca    8.2[L]      07 Nov 2024 05:25  Phos  4.1     11-06  Mg     2.40     11-07    TPro  6.2  /  Alb  3.1[L]  /  TBili  1.7[H]  /  DBili  x   /  AST  30  /  ALT  63[H]  /  AlkPhos  207[H]  11-07    Creatinine Trend: 0.84<--, 0.79<--, 0.76<--, 0.81<--, 1.05<--    Urinalysis Basic - ( 07 Nov 2024 05:25 )    Color: x / Appearance: x / SG: x / pH: x  Gluc: 107 mg/dL / Ketone: x  / Bili: x / Urobili: x   Blood: x / Protein: x / Nitrite: x   Leuk Esterase: x / RBC: x / WBC x   Sq Epi: x / Non Sq Epi: x / Bacteria: x            MICROBIOLOGY:       RADIOLOGY:  [ ] Reviewed by me        Color: x / Appearance: x / SG: x / pH: x  Gluc: 111 mg/dL / Ketone: x  / Bili: x / Urobili: x   Blood: x / Protein: x / Nitrite: x   Leuk Esterase: x / RBC: x / WBC x   Sq Epi: x / Non Sq Epi: x / Bacteria: x            MICROBIOLOGY:     Urinalysis with Rflx Culture (collected 03 Nov 2024 15:00)        RADIOLOGY:  [ ] Reviewed by me

## 2024-11-07 NOTE — PROGRESS NOTE ADULT - TIME-BASED BILLING (NON-CRITICAL CARE)
Time-based billing (NON-critical care)
altered mental status
Time-based billing (NON-critical care)

## 2024-11-07 NOTE — PROGRESS NOTE ADULT - PROBLEM SELECTOR PLAN 4
Total bilirubin elevated to 8 on admission, cholestatic picture including elevations in GGT, Alk Phos, and LFT's. 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     - Continue to trend: downtrending   - see cholangitis mass, likely iso of cholangitis 2/2 choledocho Total bilirubin elevated to 8 on admission, cholestatic picture including elevations in GGT, Alk Phos, and LFT's. 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     - Continue to trend: downtrending   - see cholangitis likely iso of cholangitis 2/2 choledocho

## 2024-11-07 NOTE — PROGRESS NOTE ADULT - PROBLEM SELECTOR PLAN 3
Patient with history of not being to sleep flat, and new voice hoarseness, with no lower extremity edema. Unlikely to be CHF, and echo as below. Pt describes SOB when laying on abdomen and not flat on back over the last three days, less concern for acute cardio/pulm etiology. Ruled out PE iso elevated D-dimer, and potential malignancy. Can be due to abdominal distension and positioning.     s/p CTPA (11/4): no PE or masses visualized, elevated D-dimer    - F/u TTE results: EF 55% and grade 1 diastolic dysfunction.   - PT eval: outpatient PT   - CXR: bibasilar atelactasis

## 2024-11-08 VITALS
DIASTOLIC BLOOD PRESSURE: 76 MMHG | OXYGEN SATURATION: 99 % | RESPIRATION RATE: 17 BRPM | HEART RATE: 70 BPM | TEMPERATURE: 97 F | SYSTOLIC BLOOD PRESSURE: 129 MMHG

## 2024-11-08 LAB
ALBUMIN SERPL ELPH-MCNC: 3.2 G/DL — LOW (ref 3.3–5)
ALP SERPL-CCNC: 195 U/L — HIGH (ref 40–120)
ALT FLD-CCNC: 55 U/L — HIGH (ref 4–41)
ANION GAP SERPL CALC-SCNC: 12 MMOL/L — SIGNIFICANT CHANGE UP (ref 7–14)
AST SERPL-CCNC: 28 U/L — SIGNIFICANT CHANGE UP (ref 4–40)
BASOPHILS # BLD AUTO: 0.05 K/UL — SIGNIFICANT CHANGE UP (ref 0–0.2)
BASOPHILS NFR BLD AUTO: 0.4 % — SIGNIFICANT CHANGE UP (ref 0–2)
BILIRUB SERPL-MCNC: 1.8 MG/DL — HIGH (ref 0.2–1.2)
BUN SERPL-MCNC: 16 MG/DL — SIGNIFICANT CHANGE UP (ref 7–23)
CALCIUM SERPL-MCNC: 8.4 MG/DL — SIGNIFICANT CHANGE UP (ref 8.4–10.5)
CHLORIDE SERPL-SCNC: 103 MMOL/L — SIGNIFICANT CHANGE UP (ref 98–107)
CO2 SERPL-SCNC: 23 MMOL/L — SIGNIFICANT CHANGE UP (ref 22–31)
CREAT SERPL-MCNC: 0.83 MG/DL — SIGNIFICANT CHANGE UP (ref 0.5–1.3)
EGFR: 88 ML/MIN/1.73M2 — SIGNIFICANT CHANGE UP
EOSINOPHIL # BLD AUTO: 0.27 K/UL — SIGNIFICANT CHANGE UP (ref 0–0.5)
EOSINOPHIL NFR BLD AUTO: 2.3 % — SIGNIFICANT CHANGE UP (ref 0–6)
GLUCOSE SERPL-MCNC: 99 MG/DL — SIGNIFICANT CHANGE UP (ref 70–99)
HCT VFR BLD CALC: 39.3 % — SIGNIFICANT CHANGE UP (ref 39–50)
HGB BLD-MCNC: 12.6 G/DL — LOW (ref 13–17)
IANC: 8.44 K/UL — HIGH (ref 1.8–7.4)
IMM GRANULOCYTES NFR BLD AUTO: 1.7 % — HIGH (ref 0–0.9)
LYMPHOCYTES # BLD AUTO: 17.4 % — SIGNIFICANT CHANGE UP (ref 13–44)
LYMPHOCYTES # BLD AUTO: 2 K/UL — SIGNIFICANT CHANGE UP (ref 1–3.3)
MAGNESIUM SERPL-MCNC: 2.2 MG/DL — SIGNIFICANT CHANGE UP (ref 1.6–2.6)
MCHC RBC-ENTMCNC: 28.6 PG — SIGNIFICANT CHANGE UP (ref 27–34)
MCHC RBC-ENTMCNC: 32.1 G/DL — SIGNIFICANT CHANGE UP (ref 32–36)
MCV RBC AUTO: 89.3 FL — SIGNIFICANT CHANGE UP (ref 80–100)
MONOCYTES # BLD AUTO: 0.54 K/UL — SIGNIFICANT CHANGE UP (ref 0–0.9)
MONOCYTES NFR BLD AUTO: 4.7 % — SIGNIFICANT CHANGE UP (ref 2–14)
MRSA PCR RESULT.: SIGNIFICANT CHANGE UP
NEUTROPHILS # BLD AUTO: 8.44 K/UL — HIGH (ref 1.8–7.4)
NEUTROPHILS NFR BLD AUTO: 73.5 % — SIGNIFICANT CHANGE UP (ref 43–77)
NRBC # BLD: 0 /100 WBCS — SIGNIFICANT CHANGE UP (ref 0–0)
NRBC # FLD: 0 K/UL — SIGNIFICANT CHANGE UP (ref 0–0)
PHOSPHATE SERPL-MCNC: 2.9 MG/DL — SIGNIFICANT CHANGE UP (ref 2.5–4.5)
PLATELET # BLD AUTO: 200 K/UL — SIGNIFICANT CHANGE UP (ref 150–400)
POTASSIUM SERPL-MCNC: 4 MMOL/L — SIGNIFICANT CHANGE UP (ref 3.5–5.3)
POTASSIUM SERPL-SCNC: 4 MMOL/L — SIGNIFICANT CHANGE UP (ref 3.5–5.3)
PROT SERPL-MCNC: 6.2 G/DL — SIGNIFICANT CHANGE UP (ref 6–8.3)
RBC # BLD: 4.4 M/UL — SIGNIFICANT CHANGE UP (ref 4.2–5.8)
RBC # FLD: 14.6 % — HIGH (ref 10.3–14.5)
S AUREUS DNA NOSE QL NAA+PROBE: SIGNIFICANT CHANGE UP
SODIUM SERPL-SCNC: 138 MMOL/L — SIGNIFICANT CHANGE UP (ref 135–145)
WBC # BLD: 11.49 K/UL — HIGH (ref 3.8–10.5)
WBC # FLD AUTO: 11.49 K/UL — HIGH (ref 3.8–10.5)

## 2024-11-08 PROCEDURE — 99239 HOSP IP/OBS DSCHRG MGMT >30: CPT

## 2024-11-08 RX ORDER — URSODIOL 500 MG/1
1 TABLET, FILM COATED ORAL
Qty: 60 | Refills: 0
Start: 2024-11-08 | End: 2024-12-07

## 2024-11-08 RX ORDER — LEVOFLOXACIN 750 MG/1
1 TABLET, FILM COATED ORAL
Qty: 2 | Refills: 0
Start: 2024-11-08 | End: 2024-11-09

## 2024-11-08 RX ADMIN — CHLORHEXIDINE GLUCONATE 1 APPLICATION(S): 40 SOLUTION TOPICAL at 11:31

## 2024-11-08 RX ADMIN — LIDOCAINE HYDROCHLORIDE 1 PATCH: 40 SOLUTION TOPICAL at 11:30

## 2024-11-08 NOTE — PROGRESS NOTE ADULT - PROBLEM SELECTOR PLAN 4
Total bilirubin elevated to 8 on admission, cholestatic picture including elevations in GGT, Alk Phos, and LFT's. 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     - Continue to trend: downtrending   - see cholangitis likely iso of cholangitis 2/2 choledocho

## 2024-11-08 NOTE — DISCHARGE NOTE NURSING/CASE MANAGEMENT/SOCIAL WORK - NSDCPEFALRISK_GEN_ALL_CORE
For information on Fall & Injury Prevention, visit: [email or web address] OR  [email or web address] OR  https://www.cdc.gov/steadi/patient.html

## 2024-11-08 NOTE — DISCHARGE NOTE NURSING/CASE MANAGEMENT/SOCIAL WORK - PATIENT PORTAL LINK FT
You can access the FollowMyHealth Patient Portal offered by [facility] by registering at the following website: [email or web address]. By joining [employer]’s FollowMyHealth portal, you will also be able to view your health information using other applications (apps) compatible with our system.

## 2024-11-08 NOTE — PROGRESS NOTE ADULT - SUBJECTIVE AND OBJECTIVE BOX
CHIEF COMPLAINT: abdominal pain and weakness    Overnight Events: None  Interval Events: Gen surg eval for potential CCY during admission, deferred at this time with outpatient follow up.    Subjective: Feels well. No fever, chills, CP, SOB. No N/V. Had abdominal pain last night, though relieved after passing gas and BM. Still has minimal appetite, though this has been unchanged. Passing gas, and had BM yesterday      OBJECTIVE:  ICU Vital Signs Last 24 Hrs  T(C): 36.5 (07 Nov 2024 05:47), Max: 36.8 (06 Nov 2024 22:29)  T(F): 97.7 (07 Nov 2024 05:47), Max: 98.2 (06 Nov 2024 22:29)  HR: 60 (07 Nov 2024 05:47) (60 - 87)  BP: 106/71 (07 Nov 2024 05:47) (105/70 - 113/63)  BP(mean): --  ABP: --  ABP(mean): --  RR: 18 (07 Nov 2024 05:47) (17 - 18)  SpO2: 98% (07 Nov 2024 05:47) (96% - 99%)    O2 Parameters below as of 07 Nov 2024 05:47  Patient On (Oxygen Delivery Method): room air              11-06 @ 07:01  -  11-07 @ 07:00  --------------------------------------------------------  IN: 780 mL / OUT: 800 mL / NET: -20 mL      CAPILLARY BLOOD GLUCOSE    PHYSICAL EXAM  General: NAD  Head: atraumatic   Eyes: Anicteric   Neck: No JVD  Cardiac: Normal s1, s2, no murmurs, rubs, gallops   Lungs: Crackles appreciated at the bases, clear otherwise.  Abdomen: Distended but soft, no tenderness to palpation, + BS   Extremities: No pedal edema   Neuro: Fully alert and oriented.      HOSPITAL MEDICATIONS:  MEDICATIONS  (STANDING):  chlorhexidine 2% Cloths 1 Application(s) Topical daily  influenza  Vaccine (HIGH DOSE) 0.5 milliLiter(s) IntraMuscular once  lactated ringers. 1000 milliLiter(s) (50 mL/Hr) IV Continuous <Continuous>  levoFLOXacin  Tablet 750 milliGRAM(s) Oral every 24 hours  lidocaine   4% Patch 1 Patch Transdermal daily  melatonin 6 milliGRAM(s) Oral at bedtime  polyethylene glycol 3350 17 Gram(s) Oral two times a day  senna 2 Tablet(s) Oral at bedtime    MEDICATIONS  (PRN):  magnesium hydroxide Suspension 30 milliLiter(s) Oral daily PRN Constipation  oxyCODONE    IR 5 milliGRAM(s) Oral every 6 hours PRN Severe Pain (7 - 10)  oxyCODONE    IR 2.5 milliGRAM(s) Oral every 6 hours PRN Moderate Pain (4 - 6)      LABS:                        11.8   12.98 )-----------( 209      ( 07 Nov 2024 05:25 )             35.9     Hgb Trend: 11.8<--, 12.3<--, 13.0<--, 12.8<--, 13.4<--  11-07    142  |  105  |  19  ----------------------------<  107[H]  4.3   |  24  |  0.84    Ca    8.2[L]      07 Nov 2024 05:25  Phos  4.1     11-06  Mg     2.40     11-07    TPro  6.2  /  Alb  3.1[L]  /  TBili  1.7[H]  /  DBili  x   /  AST  30  /  ALT  63[H]  /  AlkPhos  207[H]  11-07    Creatinine Trend: 0.84<--, 0.79<--, 0.76<--, 0.81<--, 1.05<--    Urinalysis Basic - ( 07 Nov 2024 05:25 )    Color: x / Appearance: x / SG: x / pH: x  Gluc: 107 mg/dL / Ketone: x  / Bili: x / Urobili: x   Blood: x / Protein: x / Nitrite: x   Leuk Esterase: x / RBC: x / WBC x   Sq Epi: x / Non Sq Epi: x / Bacteria: x            MICROBIOLOGY:       RADIOLOGY:  [ ] Reviewed by me        Color: x / Appearance: x / SG: x / pH: x  Gluc: 111 mg/dL / Ketone: x  / Bili: x / Urobili: x   Blood: x / Protein: x / Nitrite: x   Leuk Esterase: x / RBC: x / WBC x   Sq Epi: x / Non Sq Epi: x / Bacteria: x            MICROBIOLOGY:     Urinalysis with Rflx Culture (collected 03 Nov 2024 15:00)        RADIOLOGY:  [ ] Reviewed by me       CHIEF COMPLAINT: abdominal pain and weakness    Overnight Events: None  Interval Events: None    Subjective: Feels well. No fever, chills, CP, SOB. No N/V, no abdominal pain. Still has minimal appetite, though tolerating PO well (says he ate well yesterday). Passing gas and had BM yesterday.   OBJECTIVE:  ICU Vital Signs Last 24 Hrs  T(C): 36.5 (07 Nov 2024 05:47), Max: 36.8 (06 Nov 2024 22:29)  T(F): 97.7 (07 Nov 2024 05:47), Max: 98.2 (06 Nov 2024 22:29)  HR: 60 (07 Nov 2024 05:47) (60 - 87)  BP: 106/71 (07 Nov 2024 05:47) (105/70 - 113/63)  BP(mean): --  ABP: --  ABP(mean): --  RR: 18 (07 Nov 2024 05:47) (17 - 18)  SpO2: 98% (07 Nov 2024 05:47) (96% - 99%)    O2 Parameters below as of 07 Nov 2024 05:47  Patient On (Oxygen Delivery Method): room air              11-06 @ 07:01  -  11-07 @ 07:00  --------------------------------------------------------  IN: 780 mL / OUT: 800 mL / NET: -20 mL      CAPILLARY BLOOD GLUCOSE    PHYSICAL EXAM  General: NAD  Head: atraumatic   Eyes: Anicteric   Neck: No JVD  Cardiac: Normal s1, s2, no murmurs, rubs, gallops   Lungs: Crackles appreciated at the bases, clear otherwise.  Abdomen: Distended but soft, no tenderness to palpation, + BS   Extremities: No pedal edema   Neuro: Fully alert and oriented.      HOSPITAL MEDICATIONS:  MEDICATIONS  (STANDING):  chlorhexidine 2% Cloths 1 Application(s) Topical daily  influenza  Vaccine (HIGH DOSE) 0.5 milliLiter(s) IntraMuscular once  lactated ringers. 1000 milliLiter(s) (50 mL/Hr) IV Continuous <Continuous>  levoFLOXacin  Tablet 750 milliGRAM(s) Oral every 24 hours  lidocaine   4% Patch 1 Patch Transdermal daily  melatonin 6 milliGRAM(s) Oral at bedtime  polyethylene glycol 3350 17 Gram(s) Oral two times a day  senna 2 Tablet(s) Oral at bedtime    MEDICATIONS  (PRN):  magnesium hydroxide Suspension 30 milliLiter(s) Oral daily PRN Constipation  oxyCODONE    IR 5 milliGRAM(s) Oral every 6 hours PRN Severe Pain (7 - 10)  oxyCODONE    IR 2.5 milliGRAM(s) Oral every 6 hours PRN Moderate Pain (4 - 6)      LABS:                        11.8   12.98 )-----------( 209      ( 07 Nov 2024 05:25 )             35.9     Hgb Trend: 11.8<--, 12.3<--, 13.0<--, 12.8<--, 13.4<--  11-07    142  |  105  |  19  ----------------------------<  107[H]  4.3   |  24  |  0.84    Ca    8.2[L]      07 Nov 2024 05:25  Phos  4.1     11-06  Mg     2.40     11-07    TPro  6.2  /  Alb  3.1[L]  /  TBili  1.7[H]  /  DBili  x   /  AST  30  /  ALT  63[H]  /  AlkPhos  207[H]  11-07    Creatinine Trend: 0.84<--, 0.79<--, 0.76<--, 0.81<--, 1.05<--    Urinalysis Basic - ( 07 Nov 2024 05:25 )    Color: x / Appearance: x / SG: x / pH: x  Gluc: 107 mg/dL / Ketone: x  / Bili: x / Urobili: x   Blood: x / Protein: x / Nitrite: x   Leuk Esterase: x / RBC: x / WBC x   Sq Epi: x / Non Sq Epi: x / Bacteria: x            MICROBIOLOGY:       RADIOLOGY:  [ ] Reviewed by me        Color: x / Appearance: x / SG: x / pH: x  Gluc: 111 mg/dL / Ketone: x  / Bili: x / Urobili: x   Blood: x / Protein: x / Nitrite: x   Leuk Esterase: x / RBC: x / WBC x   Sq Epi: x / Non Sq Epi: x / Bacteria: x            MICROBIOLOGY:     Urinalysis with Rflx Culture (collected 03 Nov 2024 15:00)        RADIOLOGY:  [ ] Reviewed by me       CHIEF COMPLAINT: abdominal pain and weakness    Overnight Events: None  Interval Events: None    Subjective: Feels well. No fever, chills, CP, SOB. No N/V, no abdominal pain. Still has minimal appetite, though tolerating PO well (says he ate well yesterday). Passing gas and had BM yesterday.     OBJECTIVE:  ICU Vital Signs Last 24 Hrs  T(C): 36.5 (07 Nov 2024 05:47), Max: 36.8 (06 Nov 2024 22:29)  T(F): 97.7 (07 Nov 2024 05:47), Max: 98.2 (06 Nov 2024 22:29)  HR: 60 (07 Nov 2024 05:47) (60 - 87)  BP: 106/71 (07 Nov 2024 05:47) (105/70 - 113/63)  BP(mean): --  ABP: --  ABP(mean): --  RR: 18 (07 Nov 2024 05:47) (17 - 18)  SpO2: 98% (07 Nov 2024 05:47) (96% - 99%)    O2 Parameters below as of 07 Nov 2024 05:47  Patient On (Oxygen Delivery Method): room air              11-06 @ 07:01  -  11-07 @ 07:00  --------------------------------------------------------  IN: 780 mL / OUT: 800 mL / NET: -20 mL      CAPILLARY BLOOD GLUCOSE    PHYSICAL EXAM  General: NAD  Head: atraumatic   Eyes: Anicteric   Neck: No JVD  Cardiac: Normal s1, s2, no murmurs, rubs, gallops   Lungs: Crackles appreciated at the bases, clear otherwise.  Abdomen: Distended but soft, no tenderness to palpation, + BS   Extremities: No pedal edema   Neuro: Fully alert and oriented.      HOSPITAL MEDICATIONS:  MEDICATIONS  (STANDING):  chlorhexidine 2% Cloths 1 Application(s) Topical daily  influenza  Vaccine (HIGH DOSE) 0.5 milliLiter(s) IntraMuscular once  lactated ringers. 1000 milliLiter(s) (50 mL/Hr) IV Continuous <Continuous>  levoFLOXacin  Tablet 750 milliGRAM(s) Oral every 24 hours  lidocaine   4% Patch 1 Patch Transdermal daily  melatonin 6 milliGRAM(s) Oral at bedtime  polyethylene glycol 3350 17 Gram(s) Oral two times a day  senna 2 Tablet(s) Oral at bedtime    MEDICATIONS  (PRN):  magnesium hydroxide Suspension 30 milliLiter(s) Oral daily PRN Constipation  oxyCODONE    IR 5 milliGRAM(s) Oral every 6 hours PRN Severe Pain (7 - 10)  oxyCODONE    IR 2.5 milliGRAM(s) Oral every 6 hours PRN Moderate Pain (4 - 6)      LABS:                        11.8   12.98 )-----------( 209      ( 07 Nov 2024 05:25 )             35.9     Hgb Trend: 11.8<--, 12.3<--, 13.0<--, 12.8<--, 13.4<--  11-07    142  |  105  |  19  ----------------------------<  107[H]  4.3   |  24  |  0.84    Ca    8.2[L]      07 Nov 2024 05:25  Phos  4.1     11-06  Mg     2.40     11-07    TPro  6.2  /  Alb  3.1[L]  /  TBili  1.7[H]  /  DBili  x   /  AST  30  /  ALT  63[H]  /  AlkPhos  207[H]  11-07    Creatinine Trend: 0.84<--, 0.79<--, 0.76<--, 0.81<--, 1.05<--    Urinalysis Basic - ( 07 Nov 2024 05:25 )    Color: x / Appearance: x / SG: x / pH: x  Gluc: 107 mg/dL / Ketone: x  / Bili: x / Urobili: x   Blood: x / Protein: x / Nitrite: x   Leuk Esterase: x / RBC: x / WBC x   Sq Epi: x / Non Sq Epi: x / Bacteria: x            MICROBIOLOGY:       RADIOLOGY:  [ ] Reviewed by me        Color: x / Appearance: x / SG: x / pH: x  Gluc: 111 mg/dL / Ketone: x  / Bili: x / Urobili: x   Blood: x / Protein: x / Nitrite: x   Leuk Esterase: x / RBC: x / WBC x   Sq Epi: x / Non Sq Epi: x / Bacteria: x            MICROBIOLOGY:     Urinalysis with Rflx Culture (collected 03 Nov 2024 15:00)        RADIOLOGY:  [ ] Reviewed by me

## 2024-11-08 NOTE — PROGRESS NOTE ADULT - ATTENDING COMMENTS
Agree with above.    Patient was seen by our team on 11-06-24. I agree with the findings and plan of care as documented in the fellow/resident/medical student/physician assistant/nurse practitioner.    Today we are treating the patient for:   Cholangitis  choledocholithiasis    ***General note with plan / recommendation:   Agree with above, continue with antibiotics due to purulent drainage seen at time of ERCP consistent with cholangitis.   Surgical evaluation for possible cholecystectomy.    Billing:  I have reviewed records from labs    Other:  - Thank you for involving us in the care of this patient. If you have any questions regarding the plan of care please do not hesitate to call us back.  - For any questions on Monday - Friday 8 am to 5pm please message via [employer] or email [email or web address]   - For any Urgent off hours consults please contact on- call GI team. See Amion schedule ([facility]), Spok paging system ([facility]), or call hospital  ([facility])  - Rest of management as per primary team
79M No PMHx p/w acute cholangitis w/ impacted ampullary stone s/p ERCP 11/5 w/ fistulotomy and extraction of stone.    - acute cholangitis - on levaquin PO; still with difficulty taking care of PO intake at this time  - choledocholithiasis w/ Impacted ampullary stone - s/p ERCP on 11/5; monitor for post-ERCP pancreatitis; surgery consult for cholecystectomy - deferred to outpatient procedure .     Patient medically optimized for discharge.  Discharge planning 40 minutes - discussed with patient and consultants.
79M No PMHx p/w acute cholangitis w/ impacted ampullary stone s/p ERCP 11/5 w/ fistulotomy and extraction of stone.    - acute cholangitis - on levaquin PO; still with difficulty taking care of PO intake at this time  - choledocholithiasis w/ Impacted ampullary stone - s/p ERCP on 11/5; monitor for post-ERCP pancreatitis; surgery consult for cholecystectomy - deferred to outpatient procedure
79M No PMHx p/w new pancreatic mass c/f malignancy c/b hyperbilirubinemia, elevated d-dimer.    - concern for pancreatic mass from obstructive cholestasis - MRCP reviewed; potential for choledocholithiasis; ERCP planned  - elevated D-dimer - CTPA no PE, TTE reviewed; low clinical suspicion for PE
79M No PMHx p/w new pancreatic mass c/f malignancy c/b hyperbilirubinemia, elevated d-dimer.    - obstructive hyperbilirubinemia likely d/t pancreatic mass - plan for EUS/ERCP for intervention on 11/5; high clinical suspicion for malignancy; f/u bx  - elevated d-dimer - given vague concern with associated dyspnea at home, will obtain CTPA Chest to r/o PE and pulmonary mets.    Discussed with son by the bedside on 11/4 for 10 minutes  Answered all the questions.
79M No PMHx p/w acute cholangitis w/ impacted ampullary stone s/p ERCP 11/5 w/ fistulotomy and extraction of stone.    - acute cholangitis - on levaquin PO  - choledocholithiasis w/ Impacted ampullary stone - s/p ERCP on 11/5; monitor for post-ERCP pancreatitis; surgery consult for cholecystectomy

## 2024-11-08 NOTE — PROGRESS NOTE ADULT - PROBLEM SELECTOR PLAN 8
- Fluids: s/p LR @50 cc/hr for 20 hours  - Electrolytes: Will replete to maintain K>4, Phos>3, and Mag>2  - Nutrition: Regular diet   - DVT Prophylaxis: Lovenox (hold for 48 hrs s/p ERCP)   - Disposition: Home pending good PO intake today

## 2024-11-08 NOTE — PROGRESS NOTE ADULT - REASON FOR ADMISSION
Abdominal pain, weakness

## 2024-11-08 NOTE — DISCHARGE NOTE NURSING/CASE MANAGEMENT/SOCIAL WORK - FINANCIAL ASSISTANCE
[facility] provides services at a reduced cost to those who are determined to be eligible through [facility]’s financial assistance program. Information regarding [facility]’s financial assistance program can be found by going to [email or web address] or by calling [phone].

## 2024-11-08 NOTE — PROGRESS NOTE ADULT - ASSESSMENT
Patient is an 79 year old male with no significant past medical history, presenting to the ED for history of abdominal pain and weakness. Labs and CT A/P were initially reflective of pancreatic mass with biliary obstruction, concerning for pancreatic malignancy, complicated by orthopnea, s/p ERCP with GI on 11/5 with extraction of multiple stones from bile duct and no concern for mass, now admitted for treatment of acute cholangitis secondary to choledocholithiasis pending dc if tolerating PO.      Patient is an 79 year old male with no significant past medical history, presenting to the ED for history of abdominal pain and weakness. Labs and CT A/P were initially reflective of pancreatic mass with biliary obstruction, concerning for pancreatic malignancy, complicated by orthopnea, s/p ERCP with GI on 11/5 with extraction of multiple stones from bile duct and no concern for mass, now admitted for treatment of acute cholangitis secondary to choledocholithiasis pending dc today since tolerating PO.

## 2024-11-08 NOTE — PROGRESS NOTE ADULT - PROBLEM SELECTOR PLAN 5
Patient with new onset gait disturbance and history of hand tremors, potentially concerning for Parkinson's disease.    - PT eval: outpatient PT   - F/u TSH, B12, B9: largely unremarkable   - Revaluate gait when patient regains strength, able to walk well with assistance of walker

## 2024-11-08 NOTE — PROGRESS NOTE ADULT - PROBLEM SELECTOR PLAN 1
Pt came in with abdominal for one week, initial CTAP done in ED concerning for periampullary mass with intrahepatic duct dilation, associated with elevated alk phos, t. direct bili, and GGT concerning for cholestatic picture. MRCP on 11/5 with concern for obstructive two choledocholithiasis with intra and extrahepatic biliary ductal dilatation, and in the context of daily downtrending bili, may make pancreatic mass seen on initial CTAP less likely to represent malignant mass but rather two obstructing stones together.   s/p ERCP with GI on 11/5 with no concern for mass and extraction of stones performed consistent with cholangitis secondary to choledocholithiasis.     - GI recs appreciated: diet as tolerated, levofloxacin for treatment of cholangitis, monitor for bleeding, pancreatitis, and perforation, surgical eval for cholecystectomy   - f/u: CEA WNL, CA 19-9: 1506  - f/u surg eval for cholecystectomy: deferred at this time due to cholangitic inflammation, with outpatient follow up   - low concern for post-ERCP complications 11/6 and 11/7, continue encouraging PO as tolerated  - dc on levofloxacin (11/6-) for 5-7 day course? Pt came in with abdominal for one week, initial CTAP done in ED concerning for periampullary mass with intrahepatic duct dilation, associated with elevated alk phos, t. direct bili, and GGT concerning for cholestatic picture. MRCP on 11/5 with concern for obstructive two choledocholithiasis with intra and extrahepatic biliary ductal dilatation, and in the context of daily downtrending bili, may make pancreatic mass seen on initial CTAP less likely to represent malignant mass but rather two obstructing stones together.   s/p ERCP with GI on 11/5 with no concern for mass and extraction of stones performed consistent with cholangitis secondary to choledocholithiasis.     - GI recs appreciated: diet as tolerated, levofloxacin for treatment of cholangitis, monitor for bleeding, pancreatitis, and perforation, surgical eval for cholecystectomy   - f/u: CEA WNL, CA 19-9: 1506  - f/u surg eval for cholecystectomy: deferred at this time due to cholangitic inflammation, with outpatient follow up   - low concern for post-ERCP complications 11/6 and 11/7, 11/8, tolerating PO well   - dc on levofloxacin (11/6-) for 5 day course

## 2024-11-26 PROBLEM — Z78.9 OTHER SPECIFIED HEALTH STATUS: Chronic | Status: ACTIVE | Noted: 2024-11-03

## 2025-01-23 ENCOUNTER — OUTPATIENT (OUTPATIENT)
Dept: OUTPATIENT SERVICES | Facility: HOSPITAL | Age: 80
LOS: 1 days | End: 2025-01-23

## 2025-01-23 ENCOUNTER — APPOINTMENT (OUTPATIENT)
Dept: GASTROENTEROLOGY | Facility: CLINIC | Age: 80
End: 2025-01-23
Payer: MEDICARE

## 2025-01-23 VITALS
DIASTOLIC BLOOD PRESSURE: 80 MMHG | BODY MASS INDEX: 24.05 KG/M2 | HEIGHT: 70 IN | SYSTOLIC BLOOD PRESSURE: 118 MMHG | WEIGHT: 168 LBS | OXYGEN SATURATION: 95 % | HEART RATE: 70 BPM

## 2025-01-23 DIAGNOSIS — K83.09 OTHER CHOLANGITIS: ICD-10-CM

## 2025-01-23 DIAGNOSIS — Z98.890 OTHER SPECIFIED POSTPROCEDURAL STATES: Chronic | ICD-10-CM

## 2025-01-23 DIAGNOSIS — Z96.651 PRESENCE OF RIGHT ARTIFICIAL KNEE JOINT: Chronic | ICD-10-CM

## 2025-01-23 PROCEDURE — 99214 OFFICE O/P EST MOD 30 MIN: CPT | Mod: GC

## 2025-01-24 ENCOUNTER — NON-APPOINTMENT (OUTPATIENT)
Age: 80
End: 2025-01-24

## 2025-01-24 LAB
ALBUMIN SERPL ELPH-MCNC: 4.3 G/DL
ALP BLD-CCNC: 355 U/L
ALT SERPL-CCNC: 285 U/L
ANION GAP SERPL CALC-SCNC: 15 MMOL/L
AST SERPL-CCNC: 74 U/L
BILIRUB SERPL-MCNC: 0.8 MG/DL
BUN SERPL-MCNC: 11 MG/DL
CALCIUM SERPL-MCNC: 9 MG/DL
CHLORIDE SERPL-SCNC: 104 MMOL/L
CO2 SERPL-SCNC: 23 MMOL/L
CREAT SERPL-MCNC: 0.81 MG/DL
EGFR: 88 ML/MIN/1.73M2
GLUCOSE SERPL-MCNC: 99 MG/DL
POTASSIUM SERPL-SCNC: 4.1 MMOL/L
PROT SERPL-MCNC: 7.1 G/DL
SODIUM SERPL-SCNC: 143 MMOL/L

## 2025-01-30 ENCOUNTER — OUTPATIENT (OUTPATIENT)
Dept: OUTPATIENT SERVICES | Facility: HOSPITAL | Age: 80
LOS: 1 days | End: 2025-01-30

## 2025-01-30 ENCOUNTER — APPOINTMENT (OUTPATIENT)
Dept: INTERNAL MEDICINE | Facility: CLINIC | Age: 80
End: 2025-01-30
Payer: MEDICARE

## 2025-01-30 VITALS
HEART RATE: 65 BPM | HEIGHT: 70 IN | RESPIRATION RATE: 16 BRPM | OXYGEN SATURATION: 98 % | SYSTOLIC BLOOD PRESSURE: 125 MMHG | DIASTOLIC BLOOD PRESSURE: 74 MMHG | WEIGHT: 162.13 LBS | TEMPERATURE: 98.2 F | BODY MASS INDEX: 23.21 KG/M2

## 2025-01-30 DIAGNOSIS — M25.561 PAIN IN RIGHT KNEE: ICD-10-CM

## 2025-01-30 DIAGNOSIS — Z98.890 OTHER SPECIFIED POSTPROCEDURAL STATES: Chronic | ICD-10-CM

## 2025-01-30 DIAGNOSIS — Z78.9 OTHER SPECIFIED HEALTH STATUS: ICD-10-CM

## 2025-01-30 DIAGNOSIS — Z00.00 ENCOUNTER FOR GENERAL ADULT MEDICAL EXAMINATION W/OUT ABNORMAL FINDINGS: ICD-10-CM

## 2025-01-30 DIAGNOSIS — G89.29 PAIN IN RIGHT KNEE: ICD-10-CM

## 2025-01-30 DIAGNOSIS — G25.2 OTHER SPECIFIED FORMS OF TREMOR: ICD-10-CM

## 2025-01-30 DIAGNOSIS — Z96.651 PRESENCE OF RIGHT ARTIFICIAL KNEE JOINT: Chronic | ICD-10-CM

## 2025-01-30 DIAGNOSIS — R74.01 ELEVATION OF LEVELS OF LIVER TRANSAMINASE LEVELS: ICD-10-CM

## 2025-01-30 DIAGNOSIS — K80.20 CALCULUS OF GALLBLADDER W/OUT CHOLECYSTITIS W/OUT OBSTRUCTION: ICD-10-CM

## 2025-01-30 PROCEDURE — G0439: CPT

## 2025-01-31 DIAGNOSIS — Z78.9 OTHER SPECIFIED HEALTH STATUS: ICD-10-CM

## 2025-01-31 DIAGNOSIS — Z00.00 ENCOUNTER FOR GENERAL ADULT MEDICAL EXAMINATION WITHOUT ABNORMAL FINDINGS: ICD-10-CM

## 2025-01-31 DIAGNOSIS — K80.20 CALCULUS OF GALLBLADDER WITHOUT CHOLECYSTITIS WITHOUT OBSTRUCTION: ICD-10-CM

## 2025-01-31 DIAGNOSIS — G25.2 OTHER SPECIFIED FORMS OF TREMOR: ICD-10-CM

## 2025-01-31 DIAGNOSIS — M25.561 PAIN IN RIGHT KNEE: ICD-10-CM

## 2025-01-31 DIAGNOSIS — R74.01 ELEVATION OF LEVELS OF LIVER TRANSAMINASE LEVELS: ICD-10-CM

## 2025-01-31 LAB
CHOLEST SERPL-MCNC: 148 MG/DL
CHOLEST/HDLC SERPL: 3.6 RATIO
HDLC SERPL-MCNC: 41 MG/DL
LDLC SERPL CALC-MCNC: 90 MG/DL
NONHDLC SERPL-MCNC: 107 MG/DL
TRIGL SERPL-MCNC: 87 MG/DL

## 2025-04-25 NOTE — PRE-OP CHECKLIST - ASSESSMENT, HISTORY & PHYSICAL COMPLETED AND ON MEDICAL RECORD
[FreeTextEntry2] : Chance Bustos 82 y/o male with PMHx of AFib, COPD, HTN, MI, hypercholesteremia, morbid obesity, compression fracture, and depression. Patient is being seen today for initial visit.        Spouse accompanied patient during today's visit.   Today's Visit and Review - 04/25/2025  - AOx3, denies SOB, CP or dizziness, no changes in bladder and bowel functions.   - Ongoing issues with PT service for the past 3 months. Patient has not been seen by physical therapist due to limited PT availability.     - Provider to switch PT service and consider inpatient rehabilitation if there are further delays in care.     - Patient with mobility issues, requiring use of walker and assistance. Homebound.    - Patient with prior falls, expressed concern about falling in the future.    - Noted persistent chest wound. Wound is bleeding and visibly flat.   - Wound RN visit in 02/2025. Per family member/HHA, wound RN was unprepared to care for wound and did not have supplies at the time of prior visit. No current wound care in place.    -Provider recommended that patient present to hospital for further assessment of chest wound. Patient hesitant and expressed some anxiety, states he requires mental preparation before leaving the house.    - Provider spoke with patient's daughter regarding chest wound and need for hospital presentation.   - Medications reviewed and reconciled during visit.     # Ambulation: homebound, limited ambulation with walker  # Cognition and memory: good   # Mood: fair # Communication: good, responding appropriately   # Appetite: no reported issues   <> Dysphagia:  # Sleep hygiene: no reported issues.  # BM pattern: stool is soft, no blood in it,  # Urinary: denies increased frequency, pain with urination, change to color or odor of urine.    # Skin: non-healing bleeding chest wound, sores on lower extremities from walker.    # Sensory deficits: mildly [place], does not use hearing aids   # Compliance:     #Social -  with two adult children.  - Former smoker.    #DMEs - Walker  - [employer]      Review of systems otherwise NEGATIVE.   # Chronic disease     # Specialists
done

## 2025-07-23 ENCOUNTER — APPOINTMENT (OUTPATIENT)
Dept: INTERNAL MEDICINE | Facility: CLINIC | Age: 80
End: 2025-07-23

## (undated) DEVICE — BITE BLOCK ADULT 20 X 27MM (GREEN)

## (undated) DEVICE — CATH IV SAFE BC 22G X 1" (BLUE)

## (undated) DEVICE — BALLOON US ENDO

## (undated) DEVICE — ELCTR GROUNDING PAD ADULT COVIDIEN

## (undated) DEVICE — SYR LUER LOK 3CC

## (undated) DEVICE — ORGANIZER MIO MEDICAL DEVICE DISP

## (undated) DEVICE — GOWN LG

## (undated) DEVICE — ELCTR ECG CONDUCTIVE ADHESIVE

## (undated) DEVICE — SOL IRR POUR NS 0.9% 500ML

## (undated) DEVICE — DRSG 2X2

## (undated) DEVICE — OLYMPUS DISTAL COVER ENDOSCOPE

## (undated) DEVICE — SOL INJ NS 0.9% 500ML 1-PORT

## (undated) DEVICE — SALIVA EJECTOR (BLUE)

## (undated) DEVICE — NDL HYPO SAFE 22G X 1" (BLACK)

## (undated) DEVICE — DVC AUTO CAP RX LOKG

## (undated) DEVICE — PACK IV START WITH CHG

## (undated) DEVICE — DENTURE CUP PINK

## (undated) DEVICE — TUBING SUCTION NONCONDUCTIVE 6MM X 12FT

## (undated) DEVICE — DRSG BANDAID 0.75X3"

## (undated) DEVICE — DRSG CURITY GAUZE SPONGE 4 X 4" 12-PLY NON-STERILE

## (undated) DEVICE — ESU ERBE 044848: Type: DURABLE MEDICAL EQUIPMENT

## (undated) DEVICE — OMNIPAQUE 300  30ML

## (undated) DEVICE — INJ SYS RAP REFIL

## (undated) DEVICE — NDL BOSTON SCIENTIFIC RX TRIPLE LUMEN NDL KNIFE XL 5.5F

## (undated) DEVICE — BASIN EMESIS 10IN GRADUATED MAUVE

## (undated) DEVICE — SYR LUER LOK 10CC

## (undated) DEVICE — UNDERPAD LINEN SAVER 17 X 24"